# Patient Record
Sex: FEMALE | Race: WHITE | Employment: OTHER | URBAN - METROPOLITAN AREA
[De-identification: names, ages, dates, MRNs, and addresses within clinical notes are randomized per-mention and may not be internally consistent; named-entity substitution may affect disease eponyms.]

---

## 2017-10-05 ENCOUNTER — APPOINTMENT (OUTPATIENT)
Dept: CT IMAGING | Facility: CLINIC | Age: 82
End: 2017-10-05
Attending: EMERGENCY MEDICINE
Payer: MEDICARE

## 2017-10-05 ENCOUNTER — HOSPITAL ENCOUNTER (EMERGENCY)
Facility: CLINIC | Age: 82
Discharge: HOME | End: 2017-10-05
Attending: EMERGENCY MEDICINE | Admitting: EMERGENCY MEDICINE
Payer: MEDICARE

## 2017-10-05 VITALS
DIASTOLIC BLOOD PRESSURE: 79 MMHG | BODY MASS INDEX: 26.58 KG/M2 | TEMPERATURE: 98.3 F | SYSTOLIC BLOOD PRESSURE: 138 MMHG | HEIGHT: 63 IN | HEART RATE: 81 BPM | WEIGHT: 150 LBS | OXYGEN SATURATION: 95 % | RESPIRATION RATE: 16 BRPM

## 2017-10-05 DIAGNOSIS — R11.2 NAUSEA AND VOMITING, INTRACTABILITY OF VOMITING NOT SPECIFIED, UNSPECIFIED VOMITING TYPE: ICD-10-CM

## 2017-10-05 LAB
ALBUMIN SERPL-MCNC: 4.1 G/DL (ref 3.4–5)
ALBUMIN UR-MCNC: ABNORMAL MG/DL
ALP SERPL-CCNC: 100 U/L (ref 40–150)
ALT SERPL W P-5'-P-CCNC: 45 U/L (ref 0–50)
AMORPH CRY #/AREA URNS HPF: ABNORMAL /HPF
ANION GAP SERPL CALCULATED.3IONS-SCNC: 13 MMOL/L (ref 3–14)
APPEARANCE UR: CLEAR
AST SERPL W P-5'-P-CCNC: 20 U/L (ref 0–45)
BASOPHILS # BLD AUTO: 0 10E9/L (ref 0–0.2)
BASOPHILS NFR BLD AUTO: 0.1 %
BILIRUB SERPL-MCNC: 1 MG/DL (ref 0.2–1.3)
BILIRUB UR QL STRIP: NEGATIVE
BUN SERPL-MCNC: 10 MG/DL (ref 7–30)
CALCIUM SERPL-MCNC: 10.2 MG/DL (ref 8.5–10.1)
CHLORIDE SERPL-SCNC: 100 MMOL/L (ref 94–109)
CO2 SERPL-SCNC: 23 MMOL/L (ref 20–32)
COLOR UR AUTO: YELLOW
CREAT SERPL-MCNC: 0.73 MG/DL (ref 0.52–1.04)
DIFFERENTIAL METHOD BLD: ABNORMAL
EOSINOPHIL # BLD AUTO: 0 10E9/L (ref 0–0.7)
EOSINOPHIL NFR BLD AUTO: 0 %
ERYTHROCYTE [DISTWIDTH] IN BLOOD BY AUTOMATED COUNT: 14 % (ref 10–15)
GFR SERPL CREATININE-BSD FRML MDRD: 76 ML/MIN/1.7M2
GLUCOSE SERPL-MCNC: 147 MG/DL (ref 70–99)
GLUCOSE UR STRIP-MCNC: NEGATIVE MG/DL
HCT VFR BLD AUTO: 43.5 % (ref 35–47)
HGB BLD-MCNC: 15.3 G/DL (ref 11.7–15.7)
HGB UR QL STRIP: NEGATIVE
IMM GRANULOCYTES # BLD: 0 10E9/L (ref 0–0.4)
IMM GRANULOCYTES NFR BLD: 0.4 %
KETONES UR STRIP-MCNC: 40 MG/DL
LEUKOCYTE ESTERASE UR QL STRIP: ABNORMAL
LIPASE SERPL-CCNC: 94 U/L (ref 73–393)
LYMPHOCYTES # BLD AUTO: 0.9 10E9/L (ref 0.8–5.3)
LYMPHOCYTES NFR BLD AUTO: 13.7 %
MCH RBC QN AUTO: 29.3 PG (ref 26.5–33)
MCHC RBC AUTO-ENTMCNC: 35.2 G/DL (ref 31.5–36.5)
MCV RBC AUTO: 83 FL (ref 78–100)
MONOCYTES # BLD AUTO: 0.3 10E9/L (ref 0–1.3)
MONOCYTES NFR BLD AUTO: 3.9 %
NEUTROPHILS # BLD AUTO: 5.6 10E9/L (ref 1.6–8.3)
NEUTROPHILS NFR BLD AUTO: 81.9 %
NITRATE UR QL: NEGATIVE
NON-SQ EPI CELLS #/AREA URNS LPF: ABNORMAL /LPF
NRBC # BLD AUTO: 0 10*3/UL
NRBC BLD AUTO-RTO: 0 /100
PH UR STRIP: 7 PH (ref 5–7)
PLATELET # BLD AUTO: 286 10E9/L (ref 150–450)
POTASSIUM SERPL-SCNC: 3.8 MMOL/L (ref 3.4–5.3)
PROT SERPL-MCNC: 8.4 G/DL (ref 6.8–8.8)
RBC # BLD AUTO: 5.23 10E12/L (ref 3.8–5.2)
RBC #/AREA URNS AUTO: ABNORMAL /HPF
SODIUM SERPL-SCNC: 136 MMOL/L (ref 133–144)
SOURCE: ABNORMAL
SP GR UR STRIP: 1.01 (ref 1–1.03)
UROBILINOGEN UR STRIP-ACNC: 0.2 EU/DL (ref 0.2–1)
WBC # BLD AUTO: 6.8 10E9/L (ref 4–11)
WBC #/AREA URNS AUTO: ABNORMAL /HPF
YEAST #/AREA URNS HPF: ABNORMAL /HPF

## 2017-10-05 PROCEDURE — 81001 URINALYSIS AUTO W/SCOPE: CPT | Performed by: EMERGENCY MEDICINE

## 2017-10-05 PROCEDURE — 80053 COMPREHEN METABOLIC PANEL: CPT | Performed by: EMERGENCY MEDICINE

## 2017-10-05 PROCEDURE — 96361 HYDRATE IV INFUSION ADD-ON: CPT

## 2017-10-05 PROCEDURE — 83690 ASSAY OF LIPASE: CPT | Performed by: EMERGENCY MEDICINE

## 2017-10-05 PROCEDURE — 25000125 ZZHC RX 250: Performed by: EMERGENCY MEDICINE

## 2017-10-05 PROCEDURE — 85025 COMPLETE CBC W/AUTO DIFF WBC: CPT | Performed by: EMERGENCY MEDICINE

## 2017-10-05 PROCEDURE — 96360 HYDRATION IV INFUSION INIT: CPT | Mod: 59

## 2017-10-05 PROCEDURE — 74177 CT ABD & PELVIS W/CONTRAST: CPT

## 2017-10-05 PROCEDURE — 99285 EMERGENCY DEPT VISIT HI MDM: CPT | Mod: 25

## 2017-10-05 PROCEDURE — 25000128 H RX IP 250 OP 636: Performed by: EMERGENCY MEDICINE

## 2017-10-05 RX ORDER — ONDANSETRON 2 MG/ML
4 INJECTION INTRAMUSCULAR; INTRAVENOUS ONCE
Status: DISCONTINUED | OUTPATIENT
Start: 2017-10-05 | End: 2017-10-05 | Stop reason: HOSPADM

## 2017-10-05 RX ORDER — ONDANSETRON 4 MG/1
4 TABLET, ORALLY DISINTEGRATING ORAL EVERY 8 HOURS PRN
Qty: 10 TABLET | Refills: 0 | Status: SHIPPED | OUTPATIENT
Start: 2017-10-05 | End: 2017-10-08

## 2017-10-05 RX ORDER — IOPAMIDOL 755 MG/ML
75 INJECTION, SOLUTION INTRAVASCULAR ONCE
Status: COMPLETED | OUTPATIENT
Start: 2017-10-05 | End: 2017-10-05

## 2017-10-05 RX ADMIN — SODIUM CHLORIDE 63 ML: 9 INJECTION, SOLUTION INTRAVENOUS at 18:53

## 2017-10-05 RX ADMIN — SODIUM CHLORIDE 1000 ML: 9 INJECTION, SOLUTION INTRAVENOUS at 14:45

## 2017-10-05 RX ADMIN — IOPAMIDOL 75 ML: 755 INJECTION, SOLUTION INTRAVENOUS at 18:53

## 2017-10-05 ASSESSMENT — ENCOUNTER SYMPTOMS
NAUSEA: 1
DIARRHEA: 1
FEVER: 0
SHORTNESS OF BREATH: 0
ABDOMINAL PAIN: 0
COUGH: 0
VOMITING: 1

## 2017-10-05 NOTE — ED AVS SNAPSHOT
"  Emergency Department    [address] [facility] [address]    Phone:  [phone]    Fax:  [phone]                                       Salud Arroyo   MRN: [id number]    Department:   Emergency Department   Date of Visit:  10/5/2017           Patient Information     Date Of Birth          2/20/1933        Your diagnoses for this visit were:     Nausea and vomiting, intractability of vomiting not specified, unspecified vomiting type        You were seen by Stan Louie MD and Gildardo Artis MD.      Follow-up Information     Follow up with Hilda Ellis MD In 3 days.    Specialty:  Family Practice    Contact information:    [facility]  [address] [facility] [address]  [phone]          Follow up with  Emergency Department.    Specialty:  EMERGENCY MEDICINE    Why:  As needed, If symptoms worsen    Contact information:    [address] [facility] [address] [phone]        Discharge Instructions          * VOMITING [6yr-Adult]  Vomiting is a common symptom that may be due to different causes. These include gastroenteritis (\"stomach-flu\"), food poisoning and gastritis. There are other more serious causes of vomiting which may be hard to diagnose early in the illness. Therefore, it is important to watch for the warning signs listed below.  The main danger from repeated vomiting is \"dehydration\". This is due to excess loss of water and minerals from the body. When this occurs, body fluids must be replaced.`  HOME CARE:    If symptoms are severe, rest at home for the next 24 hours.    You may use acetaminophen (Tylenol) 650-1000 mg every 6 hours to control fever, unless another medicine was prescribed. [ NOTE : If you have chronic liver disease, talk with your doctor before using acetaminophen.] (Aspirin should never be used in anyone under 18 years of age who is ill with a fever. It may cause severe liver damage.)    Avoid " tobacco and alcohol use, which may worsen your symptoms.    If medicines for vomiting were prescribed, take as directed.  DURING THE FIRST 12-24 HOURS follow the diet below. Try to take frequent small sips even if you vomit occasionally:    FRUIT JUICES: Apple, grape juice, clear fruit drinks, electrolyte replacement and sports drinks.    BEVERAGES: Sport drinks such as Gatorade, soft drinks without caffeine; mineral water (plain or flavored), decaffeinated tea and coffee.    SOUPS: Clear broth, consommé and bouillon    DESSERTS: Plain gelatin (Jell-O), popsicles and fruit juice bars.  DURING THE NEXT 24 HOURS you may add the following to the above:    Hot cereal, plain toast, bread, rolls, crackers    Plain noodles, rice, mashed potatoes, chicken noodle or rice soup    Unsweetened canned fruit (avoid pineapple), bananas    Avoid dairy products    Limit caffeine and chocolate. No spices or seasonings except salt.  DURING THE NEXT 24 HOURS  Slowly go back to a normal diet, as you feel better and your symptoms lessen.  FOLLOW UP with your doctor as advised if you are not improving over the next 2-3 days.  GET PROMPT MEDICAL ATTENTION if any of the following occur:    Constant abdominal pain that stays in the same spot or gets worse    Continued vomiting (unable to keep liquids down) for 24 hours    Frequent diarrhea (more than 5 times a day); blood (red or black color) in diarrhea    No urine output for 12 hours or extreme thirst    Weakness, dizziness or fainting    Unusually drowsy or confused    Fever over 101.0  F (38.3  C) for more than 3 days    Yellow color of the eyes or skin    9965-7259 [facility], [address] [place] [address]. All rights reserved. This information is not intended as a substitute for professional medical care. Always follow your healthcare professional's instructions.      24 Hour Appointment Hotline       To make an appointment at any [facility], call [phone]  ([phone]). If you don't have a family doctor or clinic, we will help you find one. [facility] are conveniently located to serve the needs of you and your family.             Review of your medicines      START taking        Dose / Directions Last dose taken    ondansetron 4 MG ODT tab   Commonly known as:  ZOFRAN ODT   Dose:  4 mg   Quantity:  10 tablet        Take 1 tablet (4 mg) by mouth every 8 hours as needed   Refills:  0                Prescriptions were sent or printed at these locations (1 Prescription)                   Other Prescriptions                Printed at Department/Unit printer (1 of 1)         ondansetron (ZOFRAN ODT) 4 MG ODT tab                Procedures and tests performed during your visit     *UA reflex to Microscopic    CBC with platelets differential    CT Abdomen Pelvis w Contrast    Comprehensive metabolic panel    Lipase    Peripheral IV catheter    Urine Microscopic      Orders Needing Specimen Collection     None      Pending Results     Date and Time Order Name Status Description    10/5/2017 1628 CT Abdomen Pelvis w Contrast Preliminary             Pending Culture Results     No orders found from 10/3/2017 to 10/6/2017.            Pending Results Instructions     If you had any lab results that were not finalized at the time of your Discharge, you can call the ED Lab Result RN at [phone]. You will be contacted by this team for any positive Lab results or changes in treatment. The nurses are available 7 days a week from 10A to 6:30P.  You can leave a message 24 hours per day and they will return your call.        Test Results From Your Hospital Stay        10/5/2017  3:03 PM      Component Results     Component Value Ref Range & Units Status    WBC 6.8 4.0 - 11.0 10e9/L Final    RBC Count 5.23 (H) 3.8 - 5.2 10e12/L Final    Hemoglobin 15.3 11.7 - 15.7 g/dL Final    Hematocrit 43.5 35.0 - 47.0 % Final    MCV 83 78 - 100 fl Final    MCH 29.3 26.5 - 33.0 pg Final     MCHC 35.2 31.5 - 36.5 g/dL Final    RDW 14.0 10.0 - 15.0 % Final    Platelet Count 286 150 - 450 10e9/L Final    Diff Method Automated Method  Final    % Neutrophils 81.9 % Final    % Lymphocytes 13.7 % Final    % Monocytes 3.9 % Final    % Eosinophils 0.0 % Final    % Basophils 0.1 % Final    % Immature Granulocytes 0.4 % Final    Nucleated RBCs 0 0 /100 Final    Absolute Neutrophil 5.6 1.6 - 8.3 10e9/L Final    Absolute Lymphocytes 0.9 0.8 - 5.3 10e9/L Final    Absolute Monocytes 0.3 0.0 - 1.3 10e9/L Final    Absolute Eosinophils 0.0 0.0 - 0.7 10e9/L Final    Absolute Basophils 0.0 0.0 - 0.2 10e9/L Final    Abs Immature Granulocytes 0.0 0 - 0.4 10e9/L Final    Absolute Nucleated RBC 0.0  Final         10/5/2017  3:22 PM      Component Results     Component Value Ref Range & Units Status    Sodium 136 133 - 144 mmol/L Final    Potassium 3.8 3.4 - 5.3 mmol/L Final    Chloride 100 94 - 109 mmol/L Final    Carbon Dioxide 23 20 - 32 mmol/L Final    Anion Gap 13 3 - 14 mmol/L Final    Glucose 147 (H) 70 - 99 mg/dL Final    Urea Nitrogen 10 7 - 30 mg/dL Final    Creatinine 0.73 0.52 - 1.04 mg/dL Final    GFR Estimate 76 >60 mL/min/1.7m2 Final    Non  GFR Calc    GFR Estimate If Black >90 >60 mL/min/1.7m2 Final    African American GFR Calc    Calcium 10.2 (H) 8.5 - 10.1 mg/dL Final    Bilirubin Total 1.0 0.2 - 1.3 mg/dL Final    Albumin 4.1 3.4 - 5.0 g/dL Final    Protein Total 8.4 6.8 - 8.8 g/dL Final    Alkaline Phosphatase 100 40 - 150 U/L Final    ALT 45 0 - 50 U/L Final    AST 20 0 - 45 U/L Final         10/5/2017  3:22 PM      Component Results     Component Value Ref Range & Units Status    Lipase 94 73 - 393 U/L Final         10/5/2017  7:25 PM      Narrative     CT ABDOMEN PELVIS WITH CONTRAST 10/5/2017 7:17 PM    HISTORY: Vomiting.    TECHNIQUE: Helical axial scans from dome of liver through pubic  symphysis with 75 mL [id number] IV contrast. Radiation dose for this  scan was reduced using  automated exposure control, adjustment of the  mA and/or kV according to patient size, or iterative reconstruction  technique.    COMPARISON: None.    FINDINGS: The liver, spleen, pancreas and bilateral adrenal glands are  normal. There are a few small renal cysts, largest in the left lower  pole measuring 2 cm. The kidneys are otherwise unremarkable. The bowel  and mesentery in the upper abdomen appear normal.    Scans through the pelvis show no acute abnormality. There is a  retrocecal appendix with no evidence for appendicitis. No free fluid.  No uterus is seen consistent with marked uterine atrophy or prior  hysterectomy.        Impression     IMPRESSION:  1. No acute appearing abnormality in the abdomen or pelvis.  2. Small renal cysts.         10/5/2017  5:46 PM      Component Results     Component Value Ref Range & Units Status    Color Urine Yellow  Final    Appearance Urine Clear  Final    Glucose Urine Negative NEG^Negative mg/dL Final    Bilirubin Urine Negative NEG^Negative Final    Ketones Urine 40 (A) NEG^Negative mg/dL Final    Specific Gravity Urine 1.015 1.003 - 1.035 Final    Blood Urine Negative NEG^Negative Final    pH Urine 7.0 5.0 - 7.0 pH Final    Protein Albumin Urine Trace (A) NEG^Negative mg/dL Final    Urobilinogen Urine 0.2 0.2 - 1.0 EU/dL Final    Nitrite Urine Negative NEG^Negative Final    Leukocyte Esterase Urine Small (A) NEG^Negative Final    Source Midstream Urine  Final         10/5/2017  5:46 PM      Component Results     Component Value Ref Range & Units Status    WBC Urine 2-5 (A) OTO2^O - 2 /HPF Final    RBC Urine O - 2 OTO2^O - 2 /HPF Final    Squamous Epithelial /LPF Urine Many (A) FEW^Few /LPF Final    Yeast Urine Moderate (A) NEG^Negative /HPF Final    Amorphous Crystals Few (A) NEG^Negative /HPF Final                Clinical Quality Measure: Blood Pressure Screening     Your blood pressure was checked while you were in the emergency department today. The last reading we  "obtained was  BP: 141/75 . Please read the guidelines below about what these numbers mean and what you should do about them.  If your systolic blood pressure (the top number) is less than 120 and your diastolic blood pressure (the bottom number) is less than 80, then your blood pressure is normal. There is nothing more that you need to do about it.  If your systolic blood pressure (the top number) is 120-139 or your diastolic blood pressure (the bottom number) is 80-89, your blood pressure may be higher than it should be. You should have your blood pressure rechecked within a year by a primary care provider.  If your systolic blood pressure (the top number) is 140 or greater or your diastolic blood pressure (the bottom number) is 90 or greater, you may have high blood pressure. High blood pressure is treatable, but if left untreated over time it can put you at risk for heart attack, stroke, or kidney failure. You should have your blood pressure rechecked by a primary care provider within the next 4 weeks.  If your provider in the emergency department today gave you specific instructions to follow-up with your doctor or provider even sooner than that, you should follow that instruction and not wait for up to 4 weeks for your follow-up visit.        Thank you for choosing [facility]       Thank you for choosing [facility] for your care. Our goal is always to provide you with excellent care. Hearing back from our patients is one way we can continue to improve our services. Please take a few minutes to complete the written survey that you may receive in the mail after you visit with us. Thank you!        appbackrharRoundPegg Information     [facility] lets you send messages to your doctor, view your test results, renew your prescriptions, schedule appointments and more. To sign up, go to [email or web address] . Click on \"Log in\" on the left side of the screen, which will take you to the Welcome page. Then click on \"Sign up Now\" on the " right side of the page.     You will be asked to enter the access code listed below, as well as some personal information. Please follow the directions to create your username and password.     Your access code is: [email or web address]  Expires: 1/3/2018  8:18 PM     Your access code will  in 90 days. If you need help or a new code, please call your [facility] clinic or [phone].        Care EveryWhere ID     This is your Care EveryWhere ID. This could be used by other organizations to access your [facility] medical records  [id number]        Equal Access to Services     [facility]: Hadgilmar Gregory, meghan quinonez, niraj fraser, garland roberson. So [facility] [phone].    ATENCIÓN: Si habla español, tiene a rooney disposición servicios gratuitos de asistencia lingüística. Llame al [phone].    We comply with applicable federal civil rights laws and [place] laws. We do not discriminate on the basis of race, color, national origin, age, disability, sex, sexual orientation, or gender identity.            After Visit Summary       This is your record. Keep this with you and show to your community pharmacist(s) and doctor(s) at your next visit.

## 2017-10-05 NOTE — ED PROVIDER NOTES
History     Chief Complaint:  Nausea & Vomiting     HPI   Salud Arroyo is a 84 year old female with a history of CVA, HTN, hyperlipidemia, IBS, and hypokalemia who presents to the emergency department for evaluation of nausea and vomiting. The patient notes that she began to feel somewhat nauseated yesterday afternoon and subsequently had several episodes of nonbloody vomiting throughout the evening into this morning (last episode being at 1100 this morning). The patient notes that she had an episode of loose nonbloody stool this morning as well. Her continued nausea and vomiting were concerning to her and prompted her ED visit today.She denies any recent measured fevers, cough, shortness of breath, chest pain, or abdominal pain. The patient additionally expressed some concern regarding a rash on her back, though she states that she has not had any discomfort associated with this issue. Of note, the patient has had ongoing generalized fatigue and sleepiness for the past several months and is currently in the midst of a work up for this issue, including scheduled sleep study.     Allergies:  Amoxicillin  Lisinopril  Ranitidine    Medications:    Amlodipine  Losartan  Aspirin  Lexapro  Lipitor  Aricept     Past Medical History:    CVA  Osteoarthritis  HTN  Intestinal disaccharidase deficiencies and disaccharide malabsorption  Hyperlipidemia  Granuloma annulare  Basal cell carcinoma  IBS  Hypokalemia    Past Surgical History:    Hysterectomy  T and A  Breast biopsy  Tubal ligation  Cataract removal  Oophorectomy    Family History:    CAD    Social History:  Presents with her sister.  Negative for tobacco use.  Marital Status:   [5]    Review of Systems   Constitutional: Negative for fever.   Respiratory: Negative for cough and shortness of breath.    Cardiovascular: Negative for chest pain.   Gastrointestinal: Positive for diarrhea, nausea and vomiting. Negative for abdominal pain.   Skin: Positive for  "rash.   All other systems reviewed and are negative.    Physical Exam   First Vitals:  BP: 143/71  Heart Rate: 83  Temp: 98.3  F (36.8  C)  Resp: 16  Height: 160 cm (5' 3\")  Weight: 68 kg (150 lb)  SpO2: 97 %      Physical Exam  General: Appears well-developed and well-nourished.   Head: No signs of trauma.   Mouth/Throat: Oropharynx is clear and moist.   CV: Normal rate and regular rhythm.    Resp: Effort normal and breath sounds normal. No respiratory distress.   GI: Soft. There is no tenderness or guarding.  Normal bowel sounds.  No CVA tenderness.  MSK: Normal range of motion. no edema. No Calf tenderness.  Neuro: The patient is alert and oriented. Speech normal.  GCS 15  Skin: Skin is warm and dry. Minimal faintly erythematous blanching rash to lower back.   Psych: normal mood and affect. behavior is normal.       Emergency Department Course   Imaging:  Radiographic findings were communicated with the patient who voiced understanding of the findings.    CT Abdomen/Pelvis with contrast:   1. No acute appearing abnormality in the abdomen or pelvis.  2. Small renal cysts. As per radiology.    Laboratory:  CBC: WBC: 6.8, HGB: 15.3, PLT: 286  CMP: Glucose 147 (H), Calcium 10.21 (H), o/w WNL (Creatinine: 0.73)    Lipase: 94    UA with micro: ketone 40, trace protein albumin, small Leukocyte Esterase, WBC 2-5 (H), many Squamous epithelial, moderate yeast, few amorphous crystals o/w negative     Interventions:  1445 NS 1L IV    Emergency Department Course:  Nursing notes and vitals reviewed. 1622 I performed an exam of the patient as documented above.     IV inserted. Medicine administered as documented above. Blood drawn. This was sent to the lab for further testing, results above.    The patient was sent for a CT Abdomen Pelvis while in the emergency department, findings above.     The patient provided a urine sample here in the emergency department. This was sent for laboratory testing, findings above.     2123 I " rechecked the patient and discussed the results of her workup thus far. The patient tolerated PO challenge without difficulty here in the emergency department.     Findings and plan explained to the Patient. Patient discharged home with instructions regarding supportive care, medications, and reasons to return. The importance of close follow-up was reviewed. The patient was prescribed Zofran.     I personally reviewed the laboratory results with the Patient and answered all related questions prior to discharge.     Impression & Plan    Medical Decision Making:  Salud Arroyo is a 84 year old female who presents today due to nausea and vomiting.  She states that she began having symptoms yesterday and through the morning today.  She denies having any pain, fevers, diarrhea, or any other complaints.  On my exam, her abdomen was nontender.  She had brought up having a rash on the back, but she has no symptoms associated with this. The rash is fairly unimpressive. Blood work was obtained and was unremarkable, including a normal potassium level.  I did get a CT scan given the patient's age, and this is not show any signs of an acute process.  Patient able tolerate PO in the ER and she had actually had declined the Zofran that was ordered.  Given the overall negative workup, I feel that she is appropriate for discharge home and outpatient management.  She is given a prescription for Zofran and was instructed to return if she was unable to keep anything down, has fevers, worsening abdominal pain, or if she has any other new or concerning symptoms.    Diagnosis:    ICD-10-CM   1. Nausea and vomiting, intractability of vomiting not specified, unspecified vomiting type R11.2     Disposition:  discharged to home    Discharge Medications:   Details   ondansetron (ZOFRAN ODT) 4 MG ODT tab Take 1 tablet (4 mg) by mouth every 8 hours as needed, Disp-10 tablet, R-0, Local Print        I, Jesusita Peng, am serving as a scribe on  10/5/2017 at 4:22 PM to personally document services performed by Gildardo Artis MD based on my observations and the provider's statements to me.     Jesusita Peng  10/5/2017    EMERGENCY DEPARTMENT       Gildardo Artis MD  10/08/17 5095

## 2017-10-05 NOTE — ED AVS SNAPSHOT
Emergency Department    [address] [place] [address]    Phone:  [phone]    Fax:  [phone]                                       Salud Arroyo   MRN: [id number]    Department:   Emergency Department   Date of Visit:  10/5/2017           After Visit Summary Signature Page     I have received my discharge instructions, and my questions have been answered. I have discussed any challenges I see with this plan with the nurse or doctor.    ..........................................................................................................................................  Patient/Patient Representative Signature      ..........................................................................................................................................  Patient Representative Print Name and Relationship to Patient    ..................................................               ................................................  Date                                            Time    ..........................................................................................................................................  Reviewed by Signature/Title    ...................................................              ..............................................  Date                                                            Time

## 2017-10-06 NOTE — DISCHARGE INSTRUCTIONS
"   * VOMITING [6yr-Adult]  Vomiting is a common symptom that may be due to different causes. These include gastroenteritis (\"stomach-flu\"), food poisoning and gastritis. There are other more serious causes of vomiting which may be hard to diagnose early in the illness. Therefore, it is important to watch for the warning signs listed below.  The main danger from repeated vomiting is \"dehydration\". This is due to excess loss of water and minerals from the body. When this occurs, body fluids must be replaced.`  HOME CARE:    If symptoms are severe, rest at home for the next 24 hours.    You may use acetaminophen (Tylenol) 650-1000 mg every 6 hours to control fever, unless another medicine was prescribed. [ NOTE : If you have chronic liver disease, talk with your doctor before using acetaminophen.] (Aspirin should never be used in anyone under 18 years of age who is ill with a fever. It may cause severe liver damage.)    Avoid tobacco and alcohol use, which may worsen your symptoms.    If medicines for vomiting were prescribed, take as directed.  DURING THE FIRST 12-24 HOURS follow the diet below. Try to take frequent small sips even if you vomit occasionally:    FRUIT JUICES: Apple, grape juice, clear fruit drinks, electrolyte replacement and sports drinks.    BEVERAGES: Sport drinks such as Gatorade, soft drinks without caffeine; mineral water (plain or flavored), decaffeinated tea and coffee.    SOUPS: Clear broth, consommé and bouillon    DESSERTS: Plain gelatin (Jell-O), popsicles and fruit juice bars.  DURING THE NEXT 24 HOURS you may add the following to the above:    Hot cereal, plain toast, bread, rolls, crackers    Plain noodles, rice, mashed potatoes, chicken noodle or rice soup    Unsweetened canned fruit (avoid pineapple), bananas    Avoid dairy products    Limit caffeine and chocolate. No spices or seasonings except salt.  DURING THE NEXT 24 HOURS  Slowly go back to a normal diet, as you feel better and " your symptoms lessen.  FOLLOW UP with your doctor as advised if you are not improving over the next 2-3 days.  GET PROMPT MEDICAL ATTENTION if any of the following occur:    Constant abdominal pain that stays in the same spot or gets worse    Continued vomiting (unable to keep liquids down) for 24 hours    Frequent diarrhea (more than 5 times a day); blood (red or black color) in diarrhea    No urine output for 12 hours or extreme thirst    Weakness, dizziness or fainting    Unusually drowsy or confused    Fever over 101.0  F (38.3  C) for more than 3 days    Yellow color of the eyes or skin    3889-4971 [facility], [address] [place] [address]. All rights reserved. This information is not intended as a substitute for professional medical care. Always follow your healthcare professional's instructions.

## 2017-10-25 ENCOUNTER — ASSISTED LIVING VISIT (OUTPATIENT)
Dept: GERIATRICS | Facility: CLINIC | Age: 82
End: 2017-10-25
Payer: MEDICARE

## 2017-10-25 VITALS
HEART RATE: 73 BPM | DIASTOLIC BLOOD PRESSURE: 70 MMHG | RESPIRATION RATE: 18 BRPM | WEIGHT: 154.8 LBS | TEMPERATURE: 98.4 F | SYSTOLIC BLOOD PRESSURE: 149 MMHG

## 2017-10-25 DIAGNOSIS — R11.2 INTRACTABLE VOMITING WITH NAUSEA, UNSPECIFIED VOMITING TYPE: ICD-10-CM

## 2017-10-25 DIAGNOSIS — R29.6 FALLS FREQUENTLY: ICD-10-CM

## 2017-10-25 DIAGNOSIS — I10 ESSENTIAL HYPERTENSION: ICD-10-CM

## 2017-10-25 DIAGNOSIS — M85.89 OSTEOPENIA OF MULTIPLE SITES: ICD-10-CM

## 2017-10-25 DIAGNOSIS — F43.21 ADJUSTMENT DISORDER WITH DEPRESSED MOOD: ICD-10-CM

## 2017-10-25 DIAGNOSIS — E78.5 HYPERLIPIDEMIA, UNSPECIFIED HYPERLIPIDEMIA TYPE: ICD-10-CM

## 2017-10-25 DIAGNOSIS — K58.0 IRRITABLE BOWEL SYNDROME WITH DIARRHEA: ICD-10-CM

## 2017-10-25 DIAGNOSIS — E73.9 INTESTINAL DISACCHARIDASE DEFICIENCIES AND DISACCHARIDE MALABSORPTION: ICD-10-CM

## 2017-10-25 DIAGNOSIS — M62.81 GENERALIZED MUSCLE WEAKNESS: ICD-10-CM

## 2017-10-25 DIAGNOSIS — Z71.89 ADVANCE CARE PLANNING: ICD-10-CM

## 2017-10-25 DIAGNOSIS — R41.89 COGNITIVE DECLINE: ICD-10-CM

## 2017-10-25 DIAGNOSIS — G47.10 EXCESSIVE SLEEPINESS: ICD-10-CM

## 2017-10-25 DIAGNOSIS — R53.83 FATIGUE, UNSPECIFIED TYPE: ICD-10-CM

## 2017-10-25 DIAGNOSIS — Z86.73 HISTORY OF CVA (CEREBROVASCULAR ACCIDENT): Primary | ICD-10-CM

## 2017-10-25 PROBLEM — Z85.828 HISTORY OF BASAL CELL CARCINOMA: Status: ACTIVE | Noted: 2017-10-25

## 2017-10-25 PROBLEM — L57.0 ACTINIC KERATOSIS: Status: ACTIVE | Noted: 2017-10-25

## 2017-10-25 PROBLEM — L92.0 GRANULOMA ANNULARE: Status: ACTIVE | Noted: 2017-10-25

## 2017-10-25 PROBLEM — M15.9 OSTEOARTHRITIS OF MULTIPLE JOINTS: Status: ACTIVE | Noted: 2017-10-25

## 2017-10-25 PROBLEM — R73.01 IFG (IMPAIRED FASTING GLUCOSE): Status: ACTIVE | Noted: 2017-10-25

## 2017-10-25 PROBLEM — F41.9 ANXIETY: Status: ACTIVE | Noted: 2017-10-25

## 2017-10-25 PROBLEM — R73.9 HYPERGLYCEMIA: Status: ACTIVE | Noted: 2017-10-25

## 2017-10-25 RX ORDER — ATORVASTATIN CALCIUM 40 MG/1
40 TABLET, FILM COATED ORAL AT BEDTIME
COMMUNITY
Start: 2017-09-11 | End: 2018-01-30

## 2017-10-25 RX ORDER — DONEPEZIL HYDROCHLORIDE 10 MG/1
10 TABLET, FILM COATED ORAL AT BEDTIME
COMMUNITY
Start: 2017-09-22 | End: 2017-11-09

## 2017-10-25 RX ORDER — LOPERAMIDE HCL 2 MG
1 CAPSULE ORAL AT BEDTIME
COMMUNITY
End: 2018-02-02

## 2017-10-25 RX ORDER — AMLODIPINE BESYLATE 10 MG/1
TABLET ORAL
COMMUNITY
Start: 2016-12-28 | End: 2018-01-30

## 2017-10-25 RX ORDER — LOSARTAN POTASSIUM 100 MG/1
50 TABLET ORAL DAILY
COMMUNITY
Start: 2017-03-27 | End: 2018-01-30

## 2017-10-25 RX ORDER — ESCITALOPRAM OXALATE 5 MG/1
5 TABLET ORAL AT BEDTIME
COMMUNITY
Start: 2017-09-11 | End: 2018-01-02

## 2017-10-25 NOTE — PROGRESS NOTES
[facility]  PRIMARY CARE PROVIDER AND CLINIC:  JoseArmando ames Daniella [address] / [place] [address]  Chief Complaint   Patient presents with     [facility]       HPI:    Salud Arroyo is a 84 year old  (2/20/1933), with a history of CVA, HTN, hyperlipidemia, IBS, hypokalemia, cognitive decline, depression, generalized fatigue and sleep disturbance who moved to [facility] on 9/27/17 from a senior condo. She has two supportives son Tj and Asher whom are at this visit today and a daughter Lynne who also is involved with ongoing care.  Most recently in the  Emergency Room  [facility] 10/05/2017 for nausea, vomiting and diarrhea. She is new to [facility] for onsite medical services and medication management.. HPI information obtained from: facility chart records, patient report and Care Everywhere Epic chart review.      Current issues are:        History of CVA (cerebrovascular accident)  Cognitive decline  Brain MRI 8/81/17 showed chronic lacunar-type stroke in the anterior thalamus and internal capsule in the right side. Additionally there are chronic small vessel ischemic changes seen bilaterally which are fairly extensive. Noted were also some acute nerve infarcts and volume loss. Sons report significant change in cognition over the past summer. Hx of dehydration and hypokalemia that can exacerbate symptoms. She was hospitalized in Jan 2017 2/2 to above. Cognitive testing 8/2017 24/30 on mini mental. Follows with neurology Dr. Rose from [facility]. He has dx her with Alzheimer-type dementia with significant short-term memory impairment. At today's visit she has a pleasant but flat affect. She jokes at times about being pulled from bed to this meeting and that bed is her best friend. She also endorses being sad about he new self.    Hyperlipidemia, unspecified hyperlipidemia type  Essential hypertension  Family purchased her a BP  cuff. Have some reported readings from 9/2017. Average BP in the afternoon is 148/74 with HR of 79. She denies chest pain or shortness of breath.     Generalized muscle weakness  Falls frequently  Osteopenia of multiple sites  Had a reported fall at [facility] on 9/27/17 and does not appear she sustained injury. Stated that her left leg gave away. Has reported additional times of feeling weak in the legs. Today she is seen ambulating around her apartment w/o any assistive device. One son reported getting a call last week to help her get out of bed. She denies pain in knees and joints today for both upper and lower extremities.    Irritable bowel syndrome with diarrhea  Intestinal disaccharidase deficiencies and disaccharide malabsorption  Intractable vomiting with nausea, unspecified vomiting type  Follows with GI- Dr. Tiffani Chou. Had 25 lbs weight loss over the past 6 months. Recent CBC,TSH CMP, Lyme. B1, B12  grossly unremarkable. Has hx of fecal incontinence but today her son Tj reports that this has improved. She denies abdominal discomfort, pain or bloating. She was on a probiotic but has since discontinued use. She had a hx of belching that is reported being resolved with antibiotic use.     Excessive sleepiness  Adjustment disorder with depressed mood  Fatigue, unspecified type  Reported by sons she spends up to 16 hours daily in bed. She does not deny this. She reports to not sleeping, reading or watching TV but just laying in bed and feeling tired all the time. Hx of socializing, entertaining. Family reports she is apathic, not answering her phone or door. She is open to trying a different antidepressant. Discussion of activating versus neutral or sedating depression medication.     Advance Care Planning  Resident has advance healthcare directive but no POLST on file.    CODE STATUS/ADVANCE DIRECTIVES DISCUSSION:   CPR/Full code   Patient's living condition: lives in an assisted living  facility    ALLERGIES:Amoxicillin; Lisinopril; and Ranitidine  PAST MEDICAL HISTORY:  has a past medical history of Cancer (H); Cataract; Granuloma annulare; HTN (hypertension); Hyperlipidemia; IFG (impaired fasting glucose); Irritable bowel syndrome; Osteopenia; and Uveitis.  PAST SURGICAL HISTORY:  has a past surgical history that includes Hysterectomy; tonsillectomy; adenoidectomy; tubal ligation; nasal/sinus polypectomy; breast biopsy, rt/lt; CATARACT REMOVAL; REMOVAL OF OVARY(S); BLPHPLSTY UP EYELD; W/EXCESS SKIN; and Eye surgery.  FAMILY HISTORY: family history includes Breast Cancer in her sister and another family member; CANCER in her sister; HEART DISEASE in her mother; Macular Degeneration in her maternal aunt.  SOCIAL HISTORY:      Post Discharge Medication Reconciliation Status: patient was not discharged from an inpatient facility.  Current Outpatient Prescriptions   Medication Sig Dispense Refill     amLODIPine (NORVASC) 10 MG tablet TAKE ONE TABLET BY MOUTH AT BEDTIME       aspirin 81 MG EC tablet Take 81 mg by mouth At Bedtime        atorvastatin (LIPITOR) 40 MG tablet Take 40 mg by mouth At Bedtime        donepezil (ARICEPT) 10 MG tablet Take 5 mg by mouth At Bedtime        escitalopram (LEXAPRO) 5 MG tablet Take 5 mg by mouth At Bedtime        loperamide (IMODIUM) 2 MG capsule Take 1 mg by mouth At Bedtime        losartan (COZAAR) 100 MG tablet TAKE ONE TABLET BY MOUTH AT BEDTIME       Multiple Vitamins-Iron (MULTI-VITAMIN  /IRON) TABS Take 1 tablet by mouth At Bedtime          ROS:  4 point ROS including Respiratory, CV, GI and , other than that noted in the HPI,  is negative    Exam:  /70  Pulse 73  Temp 98.4  F (36.9  C)  Resp 18  Wt 154 lb 12.8 oz (70.2 kg)  GENERAL APPEARANCE:  Alert, in no distress, cooperative  ENT:  Mouth and posterior oropharynx normal, moist mucous membranes  EYES:  EOM, conjunctivae, lids, pupils and irises normal  NECK:  No adenopathy,masses or  thyromegaly  RESP:  respiratory effort and palpation of chest normal, lungs clear to auscultation   CV:  Palpation and auscultation of heart done , regular rate and rhythm, no murmur, rub, or gallop, no edema  ABDOMEN:  normal bowel sounds, soft, nontender, no hepatosplenomegaly or other masses  M/S:   Gait and Station at baseline  SKIN:  Inspection of skin and subcutaneous tissue baseline  NEURO:   Cranial nerves 2-12 are normal tested and grossly at patient's baseline  PSYCH:  oriented X 3 but forgetful. Flat affect.    Lab/Diagnostic data:  CBC RESULTS:   Recent Labs   Lab Test  10/05/17   1443   WBC  6.8   RBC  5.23*   HGB  15.3   HCT  43.5   MCV  83   MCH  29.3   MCHC  35.2   RDW  14.0   PLT  286       Last Basic Metabolic Panel:  Recent Labs   Lab Test  10/05/17   1443   NA  136   POTASSIUM  3.8   CHLORIDE  100   PELON  10.2*   CO2  23   BUN  10   CR  0.73   GLC  147*       Liver Function Studies -   Recent Labs   Lab Test  10/05/17   1443   PROTTOTAL  8.4   ALBUMIN  4.1   BILITOTAL  1.0   ALKPHOS  100   AST  20   ALT  45     Lipase (08/16/2017 6:31 PM)  Lipase (08/16/2017 6:31 PM)   Component Value Ref Range   Lipase 28 8 - 78 U/L       TSH with Free T4 (if TSH Abnormal) (08/16/2017 6:31 PM)  TSH with Free T4 (if TSH Abnormal) (08/16/2017 6:31 PM)   Component Value Ref Range   Thyroid Stimulating Hormone 3.59        Lipid Panel and Direct LDL(If Needed) (08/25/2017 11:58 AM)  Lipid Panel and Direct LDL(If Needed) (08/25/2017 11:58 AM)   Component Value Ref Range   Cholesterol 237 (H) 0 - 199 mg/dL   Triglycerides 233 (H) 4 - 149 mg/dL   HDL Cholesterol 40 >39 mg/dL   Cholesterol/HDL Ratio Screen 5.9     LDL Calculated 150 (H) 19 - 130 mg/dL   Length Of Fast z0.0         Lyme Antibody, and Western Blot If Needed) (08/21/2017 9:52 AM)  Lyme Antibody, and Western Blot If Needed) (08/21/2017 9:52 AM)   Component Value Ref Range   Lyme Intepretation Negative Negative   Lyme Units 0.16  Comment:   The magnitude of  the measured result, above the cutoff, is  not indicative of the amount of antibody present.     0.00 - 0.90 IV     Vitamin B-12 (08/21/2017 9:52 AM)  Vitamin B-12 (08/21/2017 9:52 AM)   Component Value Ref Range   Vitamin B12 796 213 - 816 pg/dL       Vitamin B1 (08/25/2017 11:58 AM)  Vitamin B1 (08/25/2017 11:58 AM)   Component Value Ref Range   Vitamin B1 185 (H)  Comment:   INTERPRETIVE INFORMATION: Vitamin B1, Whole Blood  This assay measures the concentration of thiamine  diphosphate (TDP), the primary active form of vitamin B1.  Approximately 90 percent of vitamin B1 present in whole  blood is TDP. Thiamine and thiamine monophosphate, which  comprise the remaining 10 percent, are not measured.  Test developed and characteristics determined by [facility]. See Compliance Statement B: [facility]  Performed by [facility],  [address] [place] [id number] [phone]  [email or web address], Hernando Salas MD - Lab. Director     70 - 180 nmol/L       Hgb A1c (08/25/2017 11:58 AM)  Hgb A1c (08/25/2017 11:58 AM)   Component Value Ref Range   HGB A1C 6.0 (H) 4.0 - 5.6 %     Heavy Metal Screen Blood (08/21/2017 9:52 AM)  Heavy Metal Screen Blood (08/21/2017 9:52 AM)   Component Value Ref Range   Arsenic Blood <10.0  Comment:   INTERPRETIVE INFORMATION: Arsenic, Blood  Potentially toxic ranges for blood arsenic:  Greater than  or equal to 600 ug/L.  Blood arsenic is for the detection of recent exposure only.  Blood arsenic levels in healthy subjects vary considerably  with exposure to arsenic in the diet and the environment.  A 24-hour urine arsenic is useful for the detection of  chronic exposure.  Test developed and characteristics determined by [facility]. See Compliance Statement B: [facility]     0.0 - 13.0 ug/L       Urine Culture (08/21/2017 9:28 AM)  Only the most recent of 2 results within the time period is included.    Urine Culture (08/21/2017 9:28 AM)   Component Value Ref Range    Source Urine     Site       Urine Cuture 10 to 50,000 col/ml Urogenital Vivian      Enteric Stool Pathogens Panel Negative by PCR for Campylobacter group (C. coli, C.  jejuni, and C. umesh), Salmonella species, Shigella species  (including S. dysenteriae, S. boydii, S. sonnei, and S.  flexneri),  Vibrio group (V. cholerae and V.  parahaemolyticus), Yersinia enterocolitica, Shiga Toxin 1  and 2, Norovirus (GI and GII), and Rotavirus (A).     Preliminary Findings  [facility] 9/14/2017    Patient had a min HR of 41 bpm, max HR of 182 bpm, and avg HR of 72 bpm. Predominant underlying rhythm was Sinus Rhythm.         6 Supraventricular Tachycardia runs occurred, the run with the fastest interval lasting 10.5 secs with a max rate of 182 bpm, the longest lasting 18.3 secs with an avg rate of 105 bpm. Some episodes of Supraventricular Tachycardia may be Atrial Tachycardia with variable block.        Second Degree AV Block - Mobitz I (Wenckebach) was present.         Isolated SVEs were rare (<1.0%), SVE Couplets were rare (<1.0%), and SVE Triplets were rare (<1.0%).         Isolated VEs were rare (<1.0%), and no VE Couplets or VE Triplets were present.        ECHOCARDIOGRAM.  Date: 09/01/2017 Start: 02:07 PM [facility]    CONCLUSIONS  Normal left ventricular size and global and regional function.  Left ventricular ejection fraction is visually estimated at 60%.  Mild mitral regurgitation.  No obvious source of embolus identified.    No prior study for comparison.      FINDINGS    MITRAL VALVE  Mild mitral regurgitation.    AORTIC VALVE  The aortic valve is tricuspid.  There is mild aortic sclerosis.    TRICUSPID VALVE  Mild (physiologic) tricuspid regurgitation.  Pulmonary artery systolic pressure estimate is 20mmHg above RAP.  (Normal).    PULMONIC VALVE  The pulmonic valve is not well visualized, but no pulmonic  regurgitation is noted.    LEFT ATRIUM  Left atrial volume index is 29 mL/m^2. (Normal  <34mL/m^2).    LEFT VENTRICLE  Normal left ventricular size and global and regional function.  Left ventricular ejection fraction is visually estimated at 60%.  Normal left ventricular wall thickness.  Indeterminate diastolic function.    RIGHT ATRIUM  Normal right atrium.    RIGHT VENTRICLE  Normal right ventricle size and normal global function.    PERICARDIAL EFFUSION  Epicardial fat is noted.      MISCELLANEOUS  Aortic root dimension within normal limits.  The inferior vena cava is normal suggesting normal RA pressure.     COMPARISON: CT abdomen/pelvis 01/22/2017    FINDINGS:   Clear lung bases. The spleen, adrenals, gallbladder and right kidney are unremarkable. Hepatic steatosis. 2.2 cm left renal cyst, unchanged. Hysterectomy. Mild calcific aortoiliac disease without infrarenal abdominal aortic aneurysm. Normal caliber bowel. Unremarkable appendix. No adenopathy. No free intraperitoneal air. Scattered degenerative change of the spine. No suspicious appearing bony lesions.    IMPRESSION: No CT findings to explain diarrhea or weight loss. Hepatic steatosis.    MR Brain W/WO IV Cont (08/31/2017 1:03 PM)    MR Brain W/WO IV Cont (08/31/2017 1:03 PM)   Impressions   IMPRESSION:    1. No definite evidence of an acute infarct.    2. Findings most consistent with a chronic lacunar type infarct involving the anterior thalamus and internal capsule on the right.    3. Probable extensive chronic small vessel ischemic disease.    4. Multiple small lacunar type infarcts within the basal ganglia and corona radiata.    5. Volume loss.         ASSESSMENT/PLAN:  (Z86.73) History of CVA (cerebrovascular accident)  (primary encounter diagnosis)  (R41.89) Cognitive decline  Comment: Advancing and Ongoing  Plan:   -Has Neuro psych eval on 1/14/17 with Dr. Christian Del Cid  -Aricept 10 mg po daily-consider GI side effects  -Taper to d/c Escitalopram   -Begin Bupropion 75 mg po daily in the am. Zoloft also activating but has GI side  effects  -Consult to ACP therapy  -Consider Mirtazapine for appetite stimulant- monitor sedation effects if used      (E78.5) Hyperlipidemia, unspecified hyperlipidemia type  (I10) Essential hypertension  Comment: Chronic and Stable  Plan:  -Norvasc 10 mg po daily-dosed at HS PTA  -Losartan 100 mg po daily-given at HS PTA  -ASA 81 mg po daily  -Atorvastatin 40 mg po daily     (M62.81) Generalized muscle weakness  (R29.6) Falls frequently  (M85.89) Osteopenia of multiple sites  Comment: Ongoing  Plan:  -Just completed PT post admission to facility  -Consider additional therapy   -Multiple Vitamin daily    (K58.0) Irritable bowel syndrome with diarrhea  (E73.9) Intestinal disaccharidase deficiencies and disaccharide malabsorption  (R11.2) Intractable vomiting with nausea, unspecified vomiting type  Comment: Chronic- controlled currently  Plan:   -Imodium 1 mg po daily at HS  -Monitor weights and VS  -Won't take Ensure but would consider magic cups  -Consider nutrition consult    (G47.10) Excessive sleepiness  (F43.21) Adjustment disorder with depressed mood  (R53.83) Fatigue, unspecified type  Comment: Ongoing and continued issue  Plan:   --Taper to d/c Escitalopram   -Begin Bupropion 75 mg po daily in the am. Zoloft also activating but has GI side effects  -Consult to ACP therapy  -Consider Mirtazapine for appetite stimulant- monitor sedation effects if used    (Z71.89) Advance Care Planning  Comment: Ongoing  Plan:  -Resident has Health Care Directive   -Discussion of POLST document and completed      Total time with a new patient visit in the assisted livin minutes including discussions about the POC and care coordination with the patient and family, Bry. Greater than 50% of total time spent with counseling and coordinating care due to many chonic conditions    Electronically signed by:  ZAYNAB Hale CNP

## 2017-10-26 PROBLEM — G47.10 EXCESSIVE SLEEPINESS: Status: ACTIVE | Noted: 2017-10-26

## 2017-10-26 PROBLEM — E87.6 HYPOKALEMIA: Status: ACTIVE | Noted: 2017-10-26

## 2017-11-02 ENCOUNTER — NURSING HOME VISIT (OUTPATIENT)
Dept: GERIATRICS | Facility: CLINIC | Age: 82
End: 2017-11-02
Payer: MEDICARE

## 2017-11-02 VITALS
BODY MASS INDEX: 26.08 KG/M2 | RESPIRATION RATE: 16 BRPM | HEART RATE: 57 BPM | SYSTOLIC BLOOD PRESSURE: 118 MMHG | WEIGHT: 147.2 LBS | OXYGEN SATURATION: 97 % | DIASTOLIC BLOOD PRESSURE: 63 MMHG | TEMPERATURE: 97.7 F

## 2017-11-02 DIAGNOSIS — F43.21 ADJUSTMENT DISORDER WITH DEPRESSED MOOD: ICD-10-CM

## 2017-11-02 DIAGNOSIS — R29.6 FALLS FREQUENTLY: ICD-10-CM

## 2017-11-02 DIAGNOSIS — M62.81 GENERALIZED MUSCLE WEAKNESS: Primary | ICD-10-CM

## 2017-11-02 DIAGNOSIS — G47.10 HYPERSOMNIA: ICD-10-CM

## 2017-11-02 DIAGNOSIS — G47.33 OSA (OBSTRUCTIVE SLEEP APNEA): ICD-10-CM

## 2017-11-02 DIAGNOSIS — I10 ESSENTIAL HYPERTENSION: ICD-10-CM

## 2017-11-02 DIAGNOSIS — K58.0 IRRITABLE BOWEL SYNDROME WITH DIARRHEA: ICD-10-CM

## 2017-11-02 DIAGNOSIS — R41.89 COGNITIVE DECLINE: ICD-10-CM

## 2017-11-02 DIAGNOSIS — Z86.73 HISTORY OF CVA (CEREBROVASCULAR ACCIDENT): ICD-10-CM

## 2017-11-02 PROCEDURE — 99310 SBSQ NF CARE HIGH MDM 45: CPT | Performed by: NURSE PRACTITIONER

## 2017-11-02 NOTE — PROGRESS NOTES
[facility]  PRIMARY CARE PROVIDER AND CLINIC:  Armando Torres [address] / [place] [address]  Chief Complaint   Patient presents with     Hospital F/U       HPI:    Salud Arroyo is a 84 year old  (2/20/1933),admitted to the [facility] TCU from [facility].  Hospital stay 10/30/2017 through 11/02/2017.  Admitted to this facility for  rehab, medical management and nursing care.  HPI information obtained from: facility chart records, facility staff, patient report and [facility] chart review.  Current issues are:    Patient transferred to TCU from obs unit at [facility]. She originally went in for a sleep study but required a few days of monitoring due to AMS, dizziness, and unwitnessed fall on 10/30 in sleep center.   Generalized muscle weakness  Falls frequently  Patient fell in obs unit. She did have brain MRI after fall, which was essentially unchanged from August 2017.   She did work with therapy while in obs unit. She can walk up to 125'. Today she is up with therapy  walking about her room.     There is a note from her PCP at [facility]: CT in 2016 showed that she had dilated ventricles, cerebral atrophy and there was some question of normal pressure hydrocephalus.    History of CVA (cerebrovascular accident)  Cognitive decline    10/31/17 MRI of brain shows unchanged extensive chronic small vessel ischemic disease. Lacunar infarcts in right basal ganglia and thalamus and corona radiata. Probable unchanged tiny chronic left cerebellar infarct.   CPT by OCCUPATIONAL THERAPY in obs unit 4.3/5.6    Head CT 8/21/17   IMPRESSION:  1. Old appearing lacunar infarct in the right basal ganglia appears to be new since comparison.  2. The mild ventriculomegaly relative to the prominence of the sulci is similar. Differential considerations would include disproportionate central volume loss, although normal pressure hydrocephalus would be difficult to fully exclude.  2.  Moderate to marked patchy hypoattenuation in the periventricular white matter is nonspecific but in this age group most likely represents small vessel ischemic change.  She was evaluated by neurology at [facility] on 8/25/17: h/o cognitive decline since fall 2016. Decline has been gradual but as of Fall 2017 now needs help with ADLs. MMSE 26/30 with short term memory deficit. IMP: suspect Alz type dementia. Ischemic stroke 11/2016. Small vessel ischemic change of brain. She did have a ziopatch that showed 3 triggered events with sinus rhythm 6 supraventricular tachycardia runs.   She was started on aricept on 8/25/17 and dose increased on 9/21/17.    She did have neuropsychiatric testing scheduled. Unclear if this has occurred.     Adjustment disorder with depressed mood  She is reported to sleep up to 16h per day. She prefers to stay in bed all day and sleep. Notes indicate she is not socializing as she used to.   She is on lexapro, unclear when this was started.     hypersomonia- she prefers to stay in bed all day. She will sleep up to 16h per day  HÉCTOR (obstructive sleep apnea)  She was referred to sleep lab by pulmonologist. She was diagnosed with HÉCTOR on 10/30. It was explained to family that it takes two weeks for testing results to be evaluated. Will need follow up appointment.     Irritable bowel syndrome with diarrhea  She did have a GI consult in 8/16/17. At time of this consult she had  25 lbs weight loss over the past 6 months and was having 4-6 stools per day with fecal incontinence. She was prescribed imodium.     Recent CBC,TSH CMP, Lyme. B1, B12  unremarkable. She denies abdominal discomfort, pain or bloating. She was on a probiotic but has since discontinued use. She had a hx of belching that is reported being resolved with antibiotic use.   EGD done 1/23/17: Impression:          - Small hiatus hernia.                       - Normal examined duodenum.                       - Chronic gastritis evolving  to                        atrophic gastritis with no signs of                        past or present ulcer.                       - Healed mild esophagitis                       - Esophageqal dysmotility.                       - Hospital admission probably                        necessitated due to adverse reaction                        to metronidazole and/or                        clarithyomycin. Question if recent                        process is post-infectious                        gastroparesis vs functional dyspepsia  Recommendation:      - Use Prilosec (omeprazole) 20 mg PO                        daily.                       - H. pylori infection not the cause                        of symptoms. If a stronger indication                        to treat develops, would use                        alternative therapy that will need to                        be quadruple drug combo.  Essential hypertension  She continues on losartan and amlodipine.   101/49, 116/58, 101/61    CODE STATUS/ADVANCE DIRECTIVES DISCUSSION:   CPR/Full code   Patient's living condition: lives in an assisted living facility    ALLERGIES:Amoxicillin; Lisinopril; and Ranitidine  PAST MEDICAL HISTORY:  has a past medical history of Cancer (H); Cataract; Granuloma annulare; HTN (hypertension); Hyperlipidemia; IFG (impaired fasting glucose); Irritable bowel syndrome; Osteopenia; and Uveitis.  PAST SURGICAL HISTORY:  has a past surgical history that includes Hysterectomy; tonsillectomy; adenoidectomy; tubal ligation; nasal/sinus polypectomy; breast biopsy, rt/lt; CATARACT REMOVAL; REMOVAL OF OVARY(S); BLPHPLSTY UP EYELD; W/EXCESS SKIN; and Eye surgery.  FAMILY HISTORY: family history includes Breast Cancer in her sister and another family member; CANCER in her sister; HEART DISEASE in her mother; Macular Degeneration in her maternal aunt.  SOCIAL HISTORY:      Post Discharge Medication Reconciliation Status: discharge medications  reconciled, continue medications without change.  Current Outpatient Prescriptions   Medication Sig Dispense Refill     amLODIPine (NORVASC) 10 MG tablet TAKE ONE TABLET BY MOUTH AT BEDTIME       aspirin 81 MG EC tablet Take 81 mg by mouth At Bedtime        atorvastatin (LIPITOR) 40 MG tablet Take 40 mg by mouth At Bedtime        donepezil (ARICEPT) 10 MG tablet Take 5 mg by mouth At Bedtime        escitalopram (LEXAPRO) 5 MG tablet Take 5 mg by mouth At Bedtime        loperamide (IMODIUM) 2 MG capsule Take 1 mg by mouth At Bedtime        losartan (COZAAR) 100 MG tablet TAKE ONE TABLET BY MOUTH AT BEDTIME       Multiple Vitamins-Iron (MULTI-VITAMIN  /IRON) TABS Take 1 tablet by mouth At Bedtime          ROS:  4 point ROS including Respiratory, CV, GI and , other than that noted in the HPI,  is negative    Exam:  /63  Pulse 57  Temp 97.7  F (36.5  C)  Resp 16  Wt 147 lb 3.2 oz (66.8 kg)  SpO2 97%  BMI 26.08 kg/m2  GENERAL APPEARANCE:  Alert, in no distress  ENT:  Mouth and posterior oropharynx normal, moist mucous membranes, hearing acuity adeaq   EYES:  EOM, conjunctivae, lids, pupils and irises normal  NECK:  No adenopathy,masses or thyromegaly  RESP:  respiratory effort and palpation of chest normal, no respiratory distress, Lung sounds clear  CV:  Palpation and auscultation of heart done , rate and rhythm reg, no murmur, no rub or gallop, Edema none  ABDOMEN:  normal bowel sounds, soft, nontender, no hepatosplenomegaly or other masses  M/S:   Gait and station- steady, Digits and nails normal   SKIN:  Inspection/Palpation of skin and subcutaneous tissue no rash  NEURO: 2-12 in normal limits and at patient's baseline  PSYCH:  insight and judgement, memory impaired , affect and mood flat      Lab/Diagnostic data:   [facility] labs:  CBC w/Diff (10/30/2017 6:54 PM)  CBC w/Diff (10/30/2017 6:54 PM)   Component Value Ref Range   White Blood Cell Count 6.7 3.8 - 11.0 k/cmm   Red Blood Cell Count 4.83 3.70 -  5.20 m/cmm   Hemoglobin 14.1 11.8 - 15.5 g/dL   Hematocrit 41.3 35.0 - 46.0 %   Mean Corpuscular Volume 85.5 80.0 - 100.0 fL   RDW 13.2 11.0 - 15.0 %   Platelet Count 259 140 - 450 k/cmm   Basic Metabolic Panel (10/30/2017 6:54 PM)  Basic Metabolic Panel (10/30/2017 6:54 PM)   Component Value Ref Range   Creatinine Serum 0.79 0.55 - 1.02 mg/dL   Lab Glucose 106 (H)  Comment:   The stated glucose range is for the fasting state.  Non-fasting glucose range is  mg/dL     70 - 100 mg/dL   CO2 25 22 - 31 mmol/L   Chloride 104 98 - 109 mmol/L   Potassium 3.9 3.5 - 5.2 mmol/L   Sodium 140 136 - 145 mmol/L   Blood Urea Nitrogen 11 9 - 26 mg/dL   Calcium 10.0 8.4 - 10.2 mg/dL   Est GFR African Am >60 >60 mL/min/1.73m2   Est GFR Non-Afr Am >60  Comment:   Normal>60, moderate decrease 30 - 59, severe decrease 15 - 29,  renal failure <15 mL/min/1.73 m2  NOTE:  Choose the eGFR result above appropriate for the race of the patient.     >60 mL/min/1.73m2       CBC RESULTS:   Recent Labs   Lab Test  10/05/17   1443   WBC  6.8   RBC  5.23*   HGB  15.3   HCT  43.5   MCV  83   MCH  29.3   MCHC  35.2   RDW  14.0   PLT  286       Last Basic Metabolic Panel:  Recent Labs   Lab Test  10/05/17   1443   NA  136   POTASSIUM  3.8   CHLORIDE  100   PELON  10.2*   CO2  23   BUN  10   CR  0.73   GLC  147*       Liver Function Studies -   Recent Labs   Lab Test  10/05/17   1443   PROTTOTAL  8.4   ALBUMIN  4.1   BILITOTAL  1.0   ALKPHOS  100   AST  20   ALT  45       No results found for: TSH]    No results found for: A1C    ASSESSMENT/PLAN:  (M62.81) Generalized muscle weakness  (primary encounter diagnosis)  (R29.6) Falls frequently  Comment: patient transferred to TCU following obs stay at [facility]. She was admitted to obs from sleep study center. She fell in sleep center. This is preceded by a slow decline over past year with wt loss, cognitive decline in interest in socializing, decline in self care. Call placed to neurologist,   Milton, to ask about imaging that suggest NPH.   Plan: physical therapy evaluation.     (Z86.73) History of CVA (cerebrovascular accident)  Comment: multiple Head CTs and brain MRIs suggest small vessel disease and lacunar infarct in right basal ganglia.   She has cognitive impairment but otherwise nonfocal exam  Plan: physical therapy and OCCUPATIONAL THERAPY evaluation    (F43.21) Adjustment disorder with depressed mood  Comment: slow decline over past year with reduced interest in social activities and self care. She sleeps all day if left alone  Plan: ACP to see. Continue lexapro    (G47.33) HÉCTOR (obstructive sleep apnea)  Comment: recent sleep study at [facility].   Plan: follow up with sleep medicine for CPAP  (R41.89) Cognitive decline  (G47.10) Hypersomnia  Comment: unclear cause: MID vs alz ds vs depression or combination of all. Discussed with Dr. Bravo.   Plan: ritalin 2.5mg q day    (K58.0) Irritable bowel syndrome with diarrhea  Comment: has been evaluated by GI and prescribed imodium. Patient also demonstrating less interest in self care.   Plan: OCCUPATIONAL THERAPY     (I10) Essential hypertension  Comment: low BPs  Plan: decrease losartan 50mg q day        Total time spent with patient visit at the skilled nursing facility was 52 minutes including patient visit, review of past records and calls to Neurology and Dr Bravo to discuss plan of care. Greater than 50% of total time spent with counseling and coordinating care due to unclear cause of hypersomnolence, falls and cog decline    Electronically signed by:  ZAYNAB Drake CNP

## 2017-11-03 PROBLEM — G47.33 OSA (OBSTRUCTIVE SLEEP APNEA): Status: ACTIVE | Noted: 2017-11-03

## 2017-11-03 PROBLEM — R41.89 COGNITIVE DECLINE: Status: ACTIVE | Noted: 2017-11-03

## 2017-11-03 PROBLEM — F43.21 ADJUSTMENT DISORDER WITH DEPRESSED MOOD: Status: ACTIVE | Noted: 2017-11-03

## 2017-11-07 ENCOUNTER — ASSISTED LIVING VISIT (OUTPATIENT)
Dept: GERIATRICS | Facility: CLINIC | Age: 82
End: 2017-11-07
Payer: MEDICARE

## 2017-11-07 VITALS
OXYGEN SATURATION: 96 % | DIASTOLIC BLOOD PRESSURE: 60 MMHG | HEART RATE: 59 BPM | WEIGHT: 143.8 LBS | RESPIRATION RATE: 16 BRPM | SYSTOLIC BLOOD PRESSURE: 120 MMHG | BODY MASS INDEX: 25.47 KG/M2 | TEMPERATURE: 97.5 F

## 2017-11-07 DIAGNOSIS — G47.10 HYPERSOMNIA: Primary | ICD-10-CM

## 2017-11-07 DIAGNOSIS — I10 BENIGN ESSENTIAL HYPERTENSION: ICD-10-CM

## 2017-11-07 DIAGNOSIS — F02.80 MIXED ALZHEIMER'S AND VASCULAR DEMENTIA (H): ICD-10-CM

## 2017-11-07 DIAGNOSIS — R10.13 DYSPEPSIA: ICD-10-CM

## 2017-11-07 DIAGNOSIS — F01.50 MIXED ALZHEIMER'S AND VASCULAR DEMENTIA (H): ICD-10-CM

## 2017-11-07 DIAGNOSIS — G47.33 OSA (OBSTRUCTIVE SLEEP APNEA): ICD-10-CM

## 2017-11-07 DIAGNOSIS — Z86.73 HISTORY OF CVA (CEREBROVASCULAR ACCIDENT): ICD-10-CM

## 2017-11-07 DIAGNOSIS — F43.21 ADJUSTMENT DISORDER WITH DEPRESSED MOOD: ICD-10-CM

## 2017-11-07 DIAGNOSIS — G30.9 MIXED ALZHEIMER'S AND VASCULAR DEMENTIA (H): ICD-10-CM

## 2017-11-07 DIAGNOSIS — M62.81 GENERALIZED MUSCLE WEAKNESS: ICD-10-CM

## 2017-11-07 PROCEDURE — 99306 1ST NF CARE HIGH MDM 50: CPT | Performed by: INTERNAL MEDICINE

## 2017-11-07 NOTE — PROGRESS NOTES
Slaud Arroyo is a 84 year old female seen November 7, 2017 at [facility] where she was admitted last week after [facility] observation stay.     Patient was at [facility] for a sleep study, but then did not want to get OOB afterward, so stayed some portion of the next day.   While there she had an unwitnessed fall and was sent to the ER.  Workup and head imaging unremarkable.   History obtained from patient's 2 sons.    She was doing fairly well a year ago, active and social.   She developed some GI symptoms at [facility] last year and was until March before she got better.   Tx'd for H pylori   Then between March and June she seemed more her usual self, going out to eat and socializing with friends.   However at her baseline she would sleep 11-12 hours/night.    Then over the past few months she was sleeping 18-20 hours/day, not wanting to get OOB for any reason.     She was found to have HÉCTOR during her sleep study and started on CPAP; agrees to use that only intermittently.     Also felt to be depressed, makes statements about ready to go.   Psychologist is seeing her here.  Family feels her GI complaints relate to the profound fatigue.   Report she did better when she was on omeprazole last spring.   Is not currently taking that and they ask that it be restarted.      When seen today patient is in bed fully dressed, late morning.   Asks to have the blinds closed, just wants to sleep.   No longer attending to personal grooming.   AL staff reported patient had fecal incontinence while in bed last week, did not want to get OOB to have staff help her clean up   Patient sees several specialists in the [facility] system.  Saw Neurology and dx'd with Alzheimer's dementia.  Started on donepezil; no improvement noted by family and GI symptoms may be worse since then.   In TCU patient has been intermittently refusing her medications.   Started on Ritalin to see if that helps, but no success so far.        Past Medical History:   Diagnosis Date     Cancer (H)      Cataract      Granuloma annulare      HTN (hypertension)      Hyperlipidemia      IFG (impaired fasting glucose)      Irritable bowel syndrome      Osteopenia      Uveitis        Past Surgical History:   Procedure Laterality Date     ADENOIDECTOMY       AS REMOVAL OF OVARY(S)       BLPHPLSTY UP EYELD; W/EXCESS SKIN       BREAST BIOPSY, RT/LT       CATARACT REMOVAL       EYE SURGERY       HYSTERECTOMY       NASAL/SINUS POLYPECTOMY       TONSILLECTOMY       TUBAL LIGATION       Family History   Problem Relation Age of Onset     HEART DISEASE Mother      CANCER Sister      Breast Cancer Sister      Breast Cancer Other      Macular Degeneration Maternal Aunt        SH:   , lives alone at [facility].  Has lived there since June 2017    She has 3 children        Review Of Systems  Skin: negative   Eyes: impaired vision  Ears/Nose/Throat: hearing loss  Respiratory: No shortness of breath, dyspnea on exertion, cough, or hemoptysis  Cardiovascular: negative  Gastrointestinal: poor appetite, nausea, vomiting and diarrhea  Genitourinary: negative  Musculoskeletal: generalized weakness, able to ambulate with FWW, with encouragement.   2 recent unwitnessed falls.     Neurologic: STML, CPT 4.3  Psychiatric: depression  Hematologic/Lymphatic/Immunologic: negative  Endocrine: negative      GENERAL APPEARANCE: alert and no distress  /60  Pulse 59  Temp 97.5  F (36.4  C)  Resp 16  Wt 143 lb 12.8 oz (65.2 kg)  SpO2 96%  BMI 25.47 kg/m2   HEENT: normocephalic, no lesion or abnormalities  NECK: no adenopathy, no asymmetry, masses, or scars and thyroid normal to palpation  RESP: lungs clear to auscultation - no rales, rhonchi or wheezes  CV: regular rate and rhythm, normal S1 S2  ABDOMEN:  soft, nontender, no HSM or masses and bowel sounds normal  MS: extremities normal- no gross deformities noted, no evidence of inflammation in joints, FROM in all  extremities.  SKIN: no suspicious lesions or rashes  NEURO: Normal strength and tone, sensory exam grossly normal, and speech normal  PSYCH: affect flat, disengaged  LYMPHATICS: No cervical,  or supraclavicular nodes    Last Basic Metabolic Panel:  Lab Results   Component Value Date     10/05/2017      Lab Results   Component Value Date    POTASSIUM 3.8 10/05/2017     Lab Results   Component Value Date    CHLORIDE 100 10/05/2017     Lab Results   Component Value Date    PELON 10.2 10/05/2017     Lab Results   Component Value Date    CO2 23 10/05/2017     Lab Results   Component Value Date    BUN 10 10/05/2017     Lab Results   Component Value Date    CR 0.73 10/05/2017     Lab Results   Component Value Date     10/05/2017     Lab Results   Component Value Date    WBC 6.8 10/05/2017     Lab Results   Component Value Date    HGB 15.3 10/05/2017     Lab Results   Component Value Date    MCV 83 10/05/2017     Lab Results   Component Value Date     10/05/2017     10/31/17 MRI of brain shows unchanged extensive chronic small vessel ischemic disease. Lacunar infarcts in right basal ganglia and thalamus and corona radiata. Probable unchanged tiny chronic left cerebellar infarct.       IMP/PLAN:  (G47.10) Hypersomnia  (primary encounter diagnosis)  Comment: reviewed differential with her sons, including HÉCTOR, medications, depression and loss of initiation from dementia; minimal response to Ritalin although sometimes refusing it.     Plan: will endeavor to address all those concerns during TCU, as below.    (M62.81) Generalized muscle weakness  Comment: deconditioning  Plan: PHYSICAL THERAPY / OCCUPATIONAL THERAPY for strengthening, endurance, balance, gait, ADLs.   Discharge goal is return to her AL apartment           (F43.21) Adjustment disorder with depressed mood  Comment: family concerned that she is giving up.     Plan: will look toward changing escitalopram to sertraline and see if that helps.     In  house Psychologist to follow.       (G47.33) HÉCTOR (obstructive sleep apnea)  Comment: on CPAP  Plan: encourage nightly use for as many hours as possible    (Z86.73) History of CVA (cerebrovascular accident)  Comment: by imaging as above.    Plan: ASA, statin, bp meds.        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     Plan: after review with family, will d/c donepezil as ineffective and potentially contributing to GI symptoms.       (R10.13) Dyspepsia  Comment: recurrent GI symptoms, possible IBS.    Plan: restart omeprazole at 10 mg/day       (I10) Essential HTN  Comment: lower bps since TCU admission  Plan: losartan has been decreased, will also decrease amlodipine dose and follow bps.     Sandra Bravo MD

## 2017-11-09 ENCOUNTER — NURSING HOME VISIT (OUTPATIENT)
Dept: GERIATRICS | Facility: CLINIC | Age: 82
End: 2017-11-09
Payer: MEDICARE

## 2017-11-09 VITALS
OXYGEN SATURATION: 96 % | TEMPERATURE: 98 F | RESPIRATION RATE: 17 BRPM | WEIGHT: 143.8 LBS | HEART RATE: 69 BPM | HEIGHT: 63 IN | SYSTOLIC BLOOD PRESSURE: 149 MMHG | DIASTOLIC BLOOD PRESSURE: 78 MMHG | BODY MASS INDEX: 25.48 KG/M2

## 2017-11-09 DIAGNOSIS — F43.21 ADJUSTMENT DISORDER WITH DEPRESSED MOOD: ICD-10-CM

## 2017-11-09 DIAGNOSIS — Z86.73 HISTORY OF CVA (CEREBROVASCULAR ACCIDENT): ICD-10-CM

## 2017-11-09 DIAGNOSIS — R29.6 FALLS FREQUENTLY: ICD-10-CM

## 2017-11-09 DIAGNOSIS — I10 ESSENTIAL HYPERTENSION: ICD-10-CM

## 2017-11-09 DIAGNOSIS — G47.33 OSA (OBSTRUCTIVE SLEEP APNEA): ICD-10-CM

## 2017-11-09 DIAGNOSIS — M62.81 GENERALIZED MUSCLE WEAKNESS: Primary | ICD-10-CM

## 2017-11-09 DIAGNOSIS — K58.0 IRRITABLE BOWEL SYNDROME WITH DIARRHEA: ICD-10-CM

## 2017-11-09 DIAGNOSIS — R41.89 COGNITIVE DECLINE: ICD-10-CM

## 2017-11-09 DIAGNOSIS — G47.10 HYPERSOMNIA: ICD-10-CM

## 2017-11-09 PROCEDURE — 99309 SBSQ NF CARE MODERATE MDM 30: CPT | Performed by: NURSE PRACTITIONER

## 2017-11-09 NOTE — LETTER
11/9/2017        RE: Salud Arroyo  [facility]  [address]  [address]  [place] [address]        [facility]    Chief Complaint   Patient presents with     RECHECK       HPI:    Salud Arroyo is a 84 year old  (2/20/1933), who is being seen today for an episodic care visit at [facility].  HPI information obtained from: facility chart records, facility staff, patient report and [facility] chart review.    Today's concern is:      Patient transferred to TCU from obs unit at [facility]. She originally went in for a sleep study but required a few days of monitoring due to AMS, dizziness, and unwitnessed fall on 10/30 in sleep center.   Generalized muscle weakness  Falls frequently  Patient fell in obs unit. She did have brain MRI after fall, which was essentially unchanged from August 2017.   She did work with therapy while in obs unit. She can walk up to 125'.       History of CVA (cerebrovascular accident)  Cognitive decline    10/31/17 MRI of brain shows unchanged extensive chronic small vessel ischemic disease. Lacunar infarcts in right basal ganglia and thalamus and corona radiata. Probable unchanged tiny chronic left cerebellar infarct.   CPT by OCCUPATIONAL THERAPY in obs unit 4.3/5.6    Head CT 8/21/17   IMPRESSION:  1. Old appearing lacunar infarct in the right basal ganglia appears to be new since comparison.  2. The mild ventriculomegaly relative to the prominence of the sulci is similar. Differential considerations would include disproportionate central volume loss, although normal pressure hydrocephalus would be difficult to fully exclude.  2. Moderate to marked patchy hypoattenuation in the periventricular white matter is nonspecific but in this age group most likely represents small vessel ischemic change.  She was evaluated by neurology at [facility] on 8/25/17: h/o cognitive decline since fall 2016. Decline has been gradual but as of Fall 2017 now  needs help with ADLs. MMSE 26/30 with short term memory deficit. IMP: suspect Alz type dementia. Ischemic stroke 11/2016. Small vessel ischemic change of brain. She did have a ziopatch that showed 3 triggered events with sinus rhythm 6 supraventricular tachycardia runs.   She was started on aricept on 8/25/17 and dose increased on 9/21/17.  We did stop the aricept on 11/7.       Adjustment disorder with depressed mood  She is reported to sleep up to 16h per day. She prefers to stay in bed all day and sleep. Notes indicate she is not socializing as she used to.   She is on lexapro, unclear when this was started.     hypersomonia- she prefers to stay in bed all day. She will sleep up to 16h per day. Ritalin was started on 11/3. Son reports that she was much more alert yesterday. But today she continues to sleep as much as she is allowed.   HÉCTOR (obstructive sleep apnea)  She was referred to sleep lab by pulmonologist. She was diagnosed with HÉCTOR on 10/30. She does have a CPAP now but unclear if she is using it.   Irritable bowel syndrome with diarrhea  She did have a GI consult in 8/16/17. At time of this consult she had  25 lbs weight loss over the past 6 months and was having 4-6 stools per day with fecal incontinence. She was prescribed imodium.     Recent CBC,TSH CMP, Lyme. B1, B12  unremarkable. She denies abdominal discomfort, pain or bloating. She was on a probiotic but has since discontinued use. She had a hx of belching that is reported being resolved with antibiotic use.   EGD done 1/23/17: Impression:          - Small hiatus hernia.                       - Normal examined duodenum.                       - Chronic gastritis evolving to                        atrophic gastritis with no signs of                        past or present ulcer.                       - Healed mild esophagitis                       - Esophageqal dysmotility.                       - Hospital admission probably                         necessitated due to adverse reaction                        to metronidazole and/or                        clarithyomycin. Question if recent                        process is post-infectious                        gastroparesis vs functional dyspepsia  Recommendation:      - Use Prilosec (omeprazole) 20 mg PO                        daily.                       - H. pylori infection not the cause                        of symptoms. If a stronger indication                        to treat develops, would use                        alternative therapy that will need to                        be quadruple drug combo.  Essential hypertension  She continues on losartan and amlodipine.   BP: 149/78, 123/62, 126/71    ALLERGIES: Amoxicillin; Lisinopril; Ranitidine; and Zofran [ondansetron]  Past Medical, Surgical, Family and Social History reviewed and updated in [facility].    Current Outpatient Prescriptions   Medication Sig Dispense Refill     OMEPRAZOLE PO Take 10 mg by mouth every morning       METHYLPHENIDATE HCL PO Take 2.5 mg by mouth daily        amLODIPine (NORVASC) 10 MG tablet TAKE ONE TABLET BY MOUTH AT BEDTIME       aspirin 81 MG EC tablet Take 81 mg by mouth At Bedtime        atorvastatin (LIPITOR) 40 MG tablet Take 40 mg by mouth At Bedtime        escitalopram (LEXAPRO) 5 MG tablet Take 5 mg by mouth At Bedtime        loperamide (IMODIUM) 2 MG capsule Take 1 mg by mouth At Bedtime        losartan (COZAAR) 100 MG tablet Take 50 mg by mouth daily       Multiple Vitamins-Iron (MULTI-VITAMIN  /IRON) TABS Take 1 tablet by mouth At Bedtime        Medications reviewed:  Medications reconciled to facility chart and changes were made to reflect current medications as identified as above med list. Below are the changes that were made:   Medications stopped since last EPIC medication reconciliation:   Medications Discontinued During This Encounter   Medication Reason     donepezil (ARICEPT) 10 MG tablet Medication  "Reconciliation Clean Up       Medications started since last [facility] medication reconciliation:  Orders Placed This Encounter   Medications     OMEPRAZOLE PO     Sig: Take 10 mg by mouth every morning         REVIEW OF SYSTEMS:  4 point ROS including Respiratory, CV, GI and , other than that noted in the HPI,  is negative    Physical Exam:  /78  Pulse 69  Temp 98  F (36.7  C)  Resp 17  Ht 5' 3\" (1.6 m)  Wt 143 lb 12.8 oz (65.2 kg)  SpO2 96%  BMI 25.47 kg/m2  GENERAL APPEARANCE:  Alert, in no distress  ENT:  Mouth and posterior oropharynx normal, moist mucous membranes, hearing acuity adeaq   EYES:  EOM, conjunctivae, lids, pupils and irises normal  NECK:  No adenopathy,masses or thyromegaly  RESP:  respiratory effort and palpation of chest normal, no respiratory distress, Lung sounds clear  CV:  Palpation and auscultation of heart done , rate and rhythm reg, no murmur, no rub or gallop, Edema none  ABDOMEN:  normal bowel sounds, soft, nontender, no hepatosplenomegaly or other masses  M/S:   Gait and station- steady, Digits and nails normal   SKIN:  Inspection/Palpation of skin and subcutaneous tissue no rash  NEURO: 2-12 in normal limits and at patient's baseline  PSYCH:  insight and judgement, memory impaired , affect and mood flat    Recent Labs:    CBC RESULTS:   Recent Labs   Lab Test  10/05/17   1443   WBC  6.8   RBC  5.23*   HGB  15.3   HCT  43.5   MCV  83   MCH  29.3   MCHC  35.2   RDW  14.0   PLT  286       Last Basic Metabolic Panel:  Recent Labs   Lab Test  10/05/17   1443   NA  136   POTASSIUM  3.8   CHLORIDE  100   PELON  10.2*   CO2  23   BUN  10   CR  0.73   GLC  147*       Liver Function Studies -   Recent Labs   Lab Test  10/05/17   1443   PROTTOTAL  8.4   ALBUMIN  4.1   BILITOTAL  1.0   ALKPHOS  100   AST  20   ALT  45       Assessment/Plan:  (M62.81) Generalized muscle weakness  (primary encounter diagnosis)  (R29.6) Falls frequently  Comment: patient transferred to TCU following obs stay " at [facility]. She was admitted to obs from sleep study center. She fell in sleep center. This is preceded by a slow decline over past year with wt loss, cognitive decline in interest in socializing, decline in self care.   Plan: physical therapy and OCCUPATIONAL THERAPY     (Z86.73) History of CVA (cerebrovascular accident)  Comment: multiple Head CTs and brain MRIs suggest small vessel disease and lacunar infarct in right basal ganglia.   She has cognitive impairment but otherwise nonfocal exam  Plan: physical therapy and OCCUPATIONAL THERAPY evaluation    (R41.89) Cognitive decline  (F43.21) Adjustment disorder with depressed mood  (G47.10) Hypersomnia  Comment: unclear cause: MID vs alz ds vs depression or combination of all. She did seem better yesterday but today she continues to sleep all day  Plan: continue ritalin 2.5mg q day  (G47.33) HÉCTOR (obstructive sleep apnea)  Comment: recent sleep study suggest sleep apnea. She now has a CPAP  Plan: continue CPAP    (K58.0) Irritable bowel syndrome with diarrhea  Comment: currently has imodium available.   Plan: monitor    (I10) Essential hypertension  Comment: losartan recently reduced. BPs are better.   Plan: monitor        Electronically signed by  ZAYNAB Drake CNP                      Sincerely,        ZAYNAB Drake CNP

## 2017-11-09 NOTE — PROGRESS NOTES
[facility]    Chief Complaint   Patient presents with     RECHECK       HPI:    Salud Arroyo is a 84 year old  (2/20/1933), who is being seen today for an episodic care visit at [facility].  HPI information obtained from: facility chart records, facility staff, patient report and [facility] chart review.    Today's concern is:      Patient transferred to TCU from obs unit at [facility]. She originally went in for a sleep study but required a few days of monitoring due to AMS, dizziness, and unwitnessed fall on 10/30 in sleep center.   Generalized muscle weakness  Falls frequently  Patient fell in obs unit. She did have brain MRI after fall, which was essentially unchanged from August 2017.   She did work with therapy while in obs unit. She can walk up to 125'.       History of CVA (cerebrovascular accident)  Cognitive decline    10/31/17 MRI of brain shows unchanged extensive chronic small vessel ischemic disease. Lacunar infarcts in right basal ganglia and thalamus and corona radiata. Probable unchanged tiny chronic left cerebellar infarct.   CPT by OCCUPATIONAL THERAPY in obs unit 4.3/5.6    Head CT 8/21/17   IMPRESSION:  1. Old appearing lacunar infarct in the right basal ganglia appears to be new since comparison.  2. The mild ventriculomegaly relative to the prominence of the sulci is similar. Differential considerations would include disproportionate central volume loss, although normal pressure hydrocephalus would be difficult to fully exclude.  2. Moderate to marked patchy hypoattenuation in the periventricular white matter is nonspecific but in this age group most likely represents small vessel ischemic change.  She was evaluated by neurology at [facility] on 8/25/17: h/o cognitive decline since fall 2016. Decline has been gradual but as of Fall 2017 now needs help with ADLs. MMSE 26/30 with short term memory deficit. IMP: suspect Alz type dementia. Ischemic stroke  11/2016. Small vessel ischemic change of brain. She did have a ziopatch that showed 3 triggered events with sinus rhythm 6 supraventricular tachycardia runs.   She was started on aricept on 8/25/17 and dose increased on 9/21/17.  We did stop the aricept on 11/7.       Adjustment disorder with depressed mood  She is reported to sleep up to 16h per day. She prefers to stay in bed all day and sleep. Notes indicate she is not socializing as she used to.   She is on lexapro, unclear when this was started.     hypersomonia- she prefers to stay in bed all day. She will sleep up to 16h per day. Ritalin was started on 11/3. Son reports that she was much more alert yesterday. But today she continues to sleep as much as she is allowed.   HÉCTOR (obstructive sleep apnea)  She was referred to sleep lab by pulmonologist. She was diagnosed with HÉCTOR on 10/30. She does have a CPAP now but unclear if she is using it.   Irritable bowel syndrome with diarrhea  She did have a GI consult in 8/16/17. At time of this consult she had  25 lbs weight loss over the past 6 months and was having 4-6 stools per day with fecal incontinence. She was prescribed imodium.     Recent CBC,TSH CMP, Lyme. B1, B12  unremarkable. She denies abdominal discomfort, pain or bloating. She was on a probiotic but has since discontinued use. She had a hx of belching that is reported being resolved with antibiotic use.   EGD done 1/23/17: Impression:          - Small hiatus hernia.                       - Normal examined duodenum.                       - Chronic gastritis evolving to                        atrophic gastritis with no signs of                        past or present ulcer.                       - Healed mild esophagitis                       - Esophageqal dysmotility.                       - Hospital admission probably                        necessitated due to adverse reaction                        to metronidazole and/or                         clarithyomycin. Question if recent                        process is post-infectious                        gastroparesis vs functional dyspepsia  Recommendation:      - Use Prilosec (omeprazole) 20 mg PO                        daily.                       - H. pylori infection not the cause                        of symptoms. If a stronger indication                        to treat develops, would use                        alternative therapy that will need to                        be quadruple drug combo.  Essential hypertension  She continues on losartan and amlodipine.   BP: 149/78, 123/62, 126/71    ALLERGIES: Amoxicillin; Lisinopril; Ranitidine; and Zofran [ondansetron]  Past Medical, Surgical, Family and Social History reviewed and updated in [facility].    Current Outpatient Prescriptions   Medication Sig Dispense Refill     OMEPRAZOLE PO Take 10 mg by mouth every morning       METHYLPHENIDATE HCL PO Take 2.5 mg by mouth daily        amLODIPine (NORVASC) 10 MG tablet TAKE ONE TABLET BY MOUTH AT BEDTIME       aspirin 81 MG EC tablet Take 81 mg by mouth At Bedtime        atorvastatin (LIPITOR) 40 MG tablet Take 40 mg by mouth At Bedtime        escitalopram (LEXAPRO) 5 MG tablet Take 5 mg by mouth At Bedtime        loperamide (IMODIUM) 2 MG capsule Take 1 mg by mouth At Bedtime        losartan (COZAAR) 100 MG tablet Take 50 mg by mouth daily       Multiple Vitamins-Iron (MULTI-VITAMIN  /IRON) TABS Take 1 tablet by mouth At Bedtime        Medications reviewed:  Medications reconciled to facility chart and changes were made to reflect current medications as identified as above med list. Below are the changes that were made:   Medications stopped since last EPIC medication reconciliation:   Medications Discontinued During This Encounter   Medication Reason     donepezil (ARICEPT) 10 MG tablet Medication Reconciliation Clean Up       Medications started since last [facility] medication reconciliation:  Orders Placed This  "Encounter   Medications     OMEPRAZOLE PO     Sig: Take 10 mg by mouth every morning         REVIEW OF SYSTEMS:  4 point ROS including Respiratory, CV, GI and , other than that noted in the HPI,  is negative    Physical Exam:  /78  Pulse 69  Temp 98  F (36.7  C)  Resp 17  Ht 5' 3\" (1.6 m)  Wt 143 lb 12.8 oz (65.2 kg)  SpO2 96%  BMI 25.47 kg/m2  GENERAL APPEARANCE:  Alert, in no distress  ENT:  Mouth and posterior oropharynx normal, moist mucous membranes, hearing acuity adeaq   EYES:  EOM, conjunctivae, lids, pupils and irises normal  NECK:  No adenopathy,masses or thyromegaly  RESP:  respiratory effort and palpation of chest normal, no respiratory distress, Lung sounds clear  CV:  Palpation and auscultation of heart done , rate and rhythm reg, no murmur, no rub or gallop, Edema none  ABDOMEN:  normal bowel sounds, soft, nontender, no hepatosplenomegaly or other masses  M/S:   Gait and station- steady, Digits and nails normal   SKIN:  Inspection/Palpation of skin and subcutaneous tissue no rash  NEURO: 2-12 in normal limits and at patient's baseline  PSYCH:  insight and judgement, memory impaired , affect and mood flat    Recent Labs:    CBC RESULTS:   Recent Labs   Lab Test  10/05/17   1443   WBC  6.8   RBC  5.23*   HGB  15.3   HCT  43.5   MCV  83   MCH  29.3   MCHC  35.2   RDW  14.0   PLT  286       Last Basic Metabolic Panel:  Recent Labs   Lab Test  10/05/17   1443   NA  136   POTASSIUM  3.8   CHLORIDE  100   PELON  10.2*   CO2  23   BUN  10   CR  0.73   GLC  147*       Liver Function Studies -   Recent Labs   Lab Test  10/05/17   1443   PROTTOTAL  8.4   ALBUMIN  4.1   BILITOTAL  1.0   ALKPHOS  100   AST  20   ALT  45       Assessment/Plan:  (M62.81) Generalized muscle weakness  (primary encounter diagnosis)  (R29.6) Falls frequently  Comment: patient transferred to TCU following obs stay at [facility]. She was admitted to obs from sleep study center. She fell in sleep center. This is preceded by a " slow decline over past year with wt loss, cognitive decline in interest in socializing, decline in self care.   Plan: physical therapy and OCCUPATIONAL THERAPY     (Z86.73) History of CVA (cerebrovascular accident)  Comment: multiple Head CTs and brain MRIs suggest small vessel disease and lacunar infarct in right basal ganglia.   She has cognitive impairment but otherwise nonfocal exam  Plan: physical therapy and OCCUPATIONAL THERAPY evaluation    (R41.89) Cognitive decline  (F43.21) Adjustment disorder with depressed mood  (G47.10) Hypersomnia  Comment: unclear cause: MID vs alz ds vs depression or combination of all. She did seem better yesterday but today she continues to sleep all day  Plan: continue ritalin 2.5mg q day  (G47.33) HÉCTOR (obstructive sleep apnea)  Comment: recent sleep study suggest sleep apnea. She now has a CPAP  Plan: continue CPAP    (K58.0) Irritable bowel syndrome with diarrhea  Comment: currently has imodium available.   Plan: monitor    (I10) Essential hypertension  Comment: losartan recently reduced. BPs are better.   Plan: monitor        Electronically signed by  ZAYNAB Drake CNP

## 2017-11-16 ENCOUNTER — NURSING HOME VISIT (OUTPATIENT)
Dept: GERIATRICS | Facility: CLINIC | Age: 82
End: 2017-11-16
Payer: MEDICARE

## 2017-11-16 VITALS
BODY MASS INDEX: 25.23 KG/M2 | TEMPERATURE: 97 F | SYSTOLIC BLOOD PRESSURE: 149 MMHG | OXYGEN SATURATION: 98 % | HEIGHT: 63 IN | RESPIRATION RATE: 15 BRPM | DIASTOLIC BLOOD PRESSURE: 78 MMHG | WEIGHT: 142.4 LBS | HEART RATE: 63 BPM

## 2017-11-16 DIAGNOSIS — K58.0 IRRITABLE BOWEL SYNDROME WITH DIARRHEA: ICD-10-CM

## 2017-11-16 DIAGNOSIS — Z86.73 HISTORY OF CVA (CEREBROVASCULAR ACCIDENT): ICD-10-CM

## 2017-11-16 DIAGNOSIS — I10 ESSENTIAL HYPERTENSION: ICD-10-CM

## 2017-11-16 DIAGNOSIS — G47.33 OSA (OBSTRUCTIVE SLEEP APNEA): ICD-10-CM

## 2017-11-16 DIAGNOSIS — F43.21 ADJUSTMENT DISORDER WITH DEPRESSED MOOD: ICD-10-CM

## 2017-11-16 DIAGNOSIS — M62.81 GENERALIZED MUSCLE WEAKNESS: Primary | ICD-10-CM

## 2017-11-16 DIAGNOSIS — R41.89 COGNITIVE DECLINE: ICD-10-CM

## 2017-11-16 DIAGNOSIS — G47.10 HYPERSOMNIA: ICD-10-CM

## 2017-11-16 DIAGNOSIS — R29.6 FALLS FREQUENTLY: ICD-10-CM

## 2017-11-16 PROCEDURE — 99309 SBSQ NF CARE MODERATE MDM 30: CPT | Performed by: NURSE PRACTITIONER

## 2017-11-16 NOTE — PROGRESS NOTES
[facility]    Chief Complaint   Patient presents with     RECHECK       HPI:    Salud Arroyo is a 84 year old  (2/20/1933), who is being seen today for an episodic care visit at [place] on [facility].  HPI information obtained from: facility chart records, facility staff, patient report and [facility] chart review.    Today's concern is:      Patient transferred to TCU from obs unit at [facility]. She originally went in for a sleep study but required a few days of monitoring due to AMS, dizziness, and unwitnessed fall on 10/30 in sleep center.   Generalized muscle weakness  Falls frequently  Patient fell in obs unit. She did have brain MRI after fall, which was essentially unchanged from August 2017.   She did work with therapy while in obs unit. She can walk up to 125'.   She has been working with therapy in TCU. She is walking up to 300' with a walker.     History of CVA (cerebrovascular accident)  Cognitive decline  10/31/17 MRI of brain shows unchanged extensive chronic small vessel ischemic disease. Lacunar infarcts in right basal ganglia and thalamus and corona radiata. Probable unchanged tiny chronic left cerebellar infarct.   CPT by OCCUPATIONAL THERAPY in obs unit 4.3/5.6    She was evaluated by neurology at [facility] on 8/25/17: h/o cognitive decline since fall 2016. Decline has been gradual but as of Fall 2017 now needs help with ADLs. MMSE 26/30 with short term memory deficit. IMP: suspect Alz type dementia. Ischemic stroke 11/2016. Small vessel ischemic change of brain.    She was started on aricept on 8/25/17 and dose increased on 9/21/17.  We did stop the aricept on 11/7.       Adjustment disorder with depressed mood  She is reported to sleep up to 16h per day. She prefers to stay in bed all day and sleep. Notes indicate she is not socializing as she used to.   She is on lexapro, unclear when this was started.     hypersomonia  she prefers to stay in bed all day. She will  sleep up to 16h per day. Ritalin was started on 11/3.    HÉCTOR (obstructive sleep apnea)  She was referred to sleep lab by pulmonologist. She was diagnosed with HÉCTOR on 10/30. She does have a CPAP now but unclear if she is using it.     Irritable bowel syndrome with diarrhea  She did have a GI consult in 8/16/17. At time of this consult she had  25 lbs weight loss over the past 6 months and was having 4-6 stools per day with fecal incontinence. She was prescribed imodium.     Recent CBC,TSH CMP, Lyme. B1, B12  unremarkable. She denies abdominal discomfort, pain or bloating. She was on a probiotic but has since discontinued use. She had a hx of belching that is reported being resolved with antibiotic use.   EGD done 1/23/17: Impression:          - Small hiatus hernia.                       - Normal examined duodenum.                       - Chronic gastritis evolving to                        atrophic gastritis with no signs of                        past or present ulcer.                       - Healed mild esophagitis                       - Esophageqal dysmotility.                       - Hospital admission probably                        necessitated due to adverse reaction                        to metronidazole and/or                        clarithyomycin. Question if recent                        process is post-infectious                        gastroparesis vs functional dyspepsia  Recommendation:      - Use Prilosec (omeprazole) 20 mg PO                        daily.                       - H. pylori infection not the cause                        of symptoms. If a stronger indication                        to treat develops, would use                        alternative therapy that will need to                        be quadruple drug combo.  Essential hypertension  She continues on losartan and amlodipine.   BP: 149/78, 157/69, 117/69    ALLERGIES: Amoxicillin; Lisinopril; Ranitidine; and Zofran  "[ondansetron]  Past Medical, Surgical, Family and Social History reviewed and updated in [facility].    Current Outpatient Prescriptions   Medication Sig Dispense Refill     OMEPRAZOLE PO Take 10 mg by mouth every morning       METHYLPHENIDATE HCL PO Take 2.5 mg by mouth daily        amLODIPine (NORVASC) 10 MG tablet TAKE ONE HALF TABLET (5MG) BY MOUTH AT BEDTIME       aspirin 81 MG EC tablet Take 81 mg by mouth At Bedtime        atorvastatin (LIPITOR) 40 MG tablet Take 40 mg by mouth At Bedtime        escitalopram (LEXAPRO) 5 MG tablet Take 5 mg by mouth At Bedtime        loperamide (IMODIUM) 2 MG capsule Take 1 mg by mouth At Bedtime        losartan (COZAAR) 100 MG tablet Take 50 mg by mouth daily       Multiple Vitamins-Iron (MULTI-VITAMIN  /IRON) TABS Take 1 tablet by mouth At Bedtime        Medications reviewed:  Medications reconciled to facility chart and changes were made to reflect current medications as identified as above med list. Below are the changes that were made:   Medications stopped since last EPIC medication reconciliation:   There are no discontinued medications.    Medications started since last [facility] medication reconciliation:  No orders of the defined types were placed in this encounter.        REVIEW OF SYSTEMS:  4 point ROS including Respiratory, CV, GI and , other than that noted in the HPI,  is negative    Physical Exam:  /78  Pulse 63  Temp 97  F (36.1  C)  Resp 15  Ht 5' 3\" (1.6 m)  Wt 142 lb 6.4 oz (64.6 kg)  SpO2 98%  BMI 25.23 kg/m2  GENERAL APPEARANCE:  Alert, in no distress  ENT:  Mouth and posterior oropharynx normal, moist mucous membranes, hearing acuity adeaq   EYES:  EOM, conjunctivae, lids, pupils and irises normal  NECK:  No adenopathy,masses or thyromegaly  RESP:  respiratory effort and palpation of chest normal, no respiratory distress, Lung sounds clear  CV:  Palpation and auscultation of heart done , rate and rhythm reg, no murmur, no rub or gallop, Edema " none  ABDOMEN:  normal bowel sounds, soft, nontender, no hepatosplenomegaly or other masses  M/S:   Gait and station- steady, Digits and nails normal   SKIN:  Inspection/Palpation of skin and subcutaneous tissue no rash  NEURO: 2-12 in normal limits and at patient's baseline  PSYCH:  insight and judgement, memory impaired , affect and mood flat    Recent Labs:    CBC RESULTS:   Recent Labs   Lab Test  10/05/17   1443   WBC  6.8   RBC  5.23*   HGB  15.3   HCT  43.5   MCV  83   MCH  29.3   MCHC  35.2   RDW  14.0   PLT  286       Last Basic Metabolic Panel:  Recent Labs   Lab Test  10/05/17   1443   NA  136   POTASSIUM  3.8   CHLORIDE  100   PELON  10.2*   CO2  23   BUN  10   CR  0.73   GLC  147*       Liver Function Studies -   Recent Labs   Lab Test  10/05/17   1443   PROTTOTAL  8.4   ALBUMIN  4.1   BILITOTAL  1.0   ALKPHOS  100   AST  20   ALT  45         Assessment/Plan:  (M62.81) Generalized muscle weakness  (primary encounter diagnosis)  (R29.6) Falls frequently  Comment: patient transferred to TCU following obs stay at [facility]. She was admitted to obs from sleep study center.  This is preceded by a slow decline over past year with wt loss, cognitive decline in interest in socializing, decline in self care. She is cooperating with therapy and walking up to 300' with walker   Plan: physical therapy and OCCUPATIONAL THERAPY     (Z86.73) History of CVA (cerebrovascular accident)  Comment: multiple Head CTs and brain MRIs suggest small vessel disease and lacunar infarct in right basal ganglia.   She has cognitive impairment but otherwise nonfocal exam  Plan: physical therapy and OCCUPATIONAL THERAPY    (R41.89) Cognitive decline  (F43.21) Adjustment disorder with depressed mood  (G47.10) Hypersomnia  Comment: unclear cause: MID vs alz ds vs depression or combination of all. She seems to be more alert since ritalin started.   Plan: continue ritalin 2.5mg q day  (G47.33) ÉHCTOR (obstructive sleep apnea)  Comment: recent  sleep study suggest sleep apnea. She now has a CPAP  Plan: continue CPAP    (K58.0) Irritable bowel syndrome with diarrhea  Comment: currently has imodium available.   Plan: monitor    (I10) Essential hypertension  Comment: losartan recently reduced. BPs are better.   Plan: monitor        Electronically signed by  ZAYNAB Drake CNP    [phone]

## 2017-11-20 ENCOUNTER — DISCHARGE SUMMARY NURSING HOME (OUTPATIENT)
Dept: GERIATRICS | Facility: CLINIC | Age: 82
End: 2017-11-20
Payer: MEDICARE

## 2017-11-20 VITALS
TEMPERATURE: 97.1 F | RESPIRATION RATE: 17 BRPM | SYSTOLIC BLOOD PRESSURE: 111 MMHG | WEIGHT: 143 LBS | BODY MASS INDEX: 25.34 KG/M2 | DIASTOLIC BLOOD PRESSURE: 61 MMHG | OXYGEN SATURATION: 98 % | HEIGHT: 63 IN | HEART RATE: 60 BPM

## 2017-11-20 DIAGNOSIS — R29.6 FALLS FREQUENTLY: ICD-10-CM

## 2017-11-20 DIAGNOSIS — R41.89 COGNITIVE DECLINE: ICD-10-CM

## 2017-11-20 DIAGNOSIS — Z86.73 HISTORY OF CVA (CEREBROVASCULAR ACCIDENT): ICD-10-CM

## 2017-11-20 DIAGNOSIS — F43.21 ADJUSTMENT DISORDER WITH DEPRESSED MOOD: ICD-10-CM

## 2017-11-20 DIAGNOSIS — G47.10 HYPERSOMNIA: ICD-10-CM

## 2017-11-20 DIAGNOSIS — G47.33 OSA (OBSTRUCTIVE SLEEP APNEA): ICD-10-CM

## 2017-11-20 DIAGNOSIS — I10 ESSENTIAL HYPERTENSION: ICD-10-CM

## 2017-11-20 DIAGNOSIS — K58.0 IRRITABLE BOWEL SYNDROME WITH DIARRHEA: ICD-10-CM

## 2017-11-20 DIAGNOSIS — M62.81 GENERALIZED MUSCLE WEAKNESS: Primary | ICD-10-CM

## 2017-11-20 PROCEDURE — 99316 NF DSCHRG MGMT 30 MIN+: CPT | Performed by: NURSE PRACTITIONER

## 2017-11-20 NOTE — LETTER
11/20/2017        RE: Salud joseph [facility]  [address]  [address]  [place] [address]          [facility] DISCHARGE SUMMARY    PATIENT'S NAME: Salud Arroyo  YOB: 1933  MEDICAL RECORD NUMBER:  [id number]    PRIMARY CARE PROVIDER AND CLINIC RESPONSIBLE AFTER TRANSFER: Armando Torres [address] / [place] [address]     CODE STATUS/ADVANCE DIRECTIVES DISCUSSION:   CPR/Full code        Allergies   Allergen Reactions     Amoxicillin Nausea     Lisinopril Cough     Ranitidine      Zofran [Ondansetron]        TRANSFERRING PROVIDERS: ZAYNAB Drake CNP, Sandra Bravo MD  DATE OF SNF ADMISSION:  November / 02 / 2017  DATE OF SNF (anticipated) DISCHARGE: November / 22 / 2017  DISCHARGE DISPOSITION: Assisted Living: [facility] [place]   Nursing Facility: [facility] [place] stay 10/30/2017 to 11/02/2017.     Condition on Discharge:  Improving.  Function:  Walking length of hallway with walker  Cognitive Scores: CPT 5.1/5.6    Equipment: walker    DISCHARGE DIAGNOSIS:   1. Generalized muscle weakness    2. Falls frequently    3. History of CVA (cerebrovascular accident)    4. Cognitive decline    5. Adjustment disorder with depressed mood    6. Hypersomnia    7. HÉCTOR (obstructive sleep apnea)    8. Irritable bowel syndrome with diarrhea    9. Essential hypertension        HPI Nursing Facility Course:  HPI information obtained from: facility chart records, facility staff, patient report and [facility] chart review.        Patient transferred to TCU from obs unit at [facility]. She originally went in for a sleep study but required a few days of monitoring due to AMS, dizziness, and unwitnessed fall on 10/30 in sleep center.     Generalized muscle weakness  Falls frequently  Patient fell in obs unit. She did have brain MRI after fall, which was essentially unchanged from August 2017.   She has been working with therapy in  TCU. She is walking up to 300' with a walker.     History of CVA (cerebrovascular accident)  Cognitive decline  10/31/17 MRI of brain shows unchanged extensive chronic small vessel ischemic disease. Lacunar infarcts in right basal ganglia and thalamus and corona radiata. Probable unchanged tiny chronic left cerebellar infarct.   CPT by OCCUPATIONAL THERAPY in obs unit 4.3/5.6    She was evaluated by neurology at [facility] on 8/25/17: h/o cognitive decline since fall 2016. Decline has been gradual but as of Fall 2017 now needs help with ADLs. MMSE 26/30 with short term memory deficit. IMP: suspect Alz type dementia. Ischemic stroke 11/2016. Small vessel ischemic change of brain.    She was started on aricept on 8/25/17 and dose increased on 9/21/17.  We did stop the aricept on 11/7.       Adjustment disorder with depressed mood  She is reported to sleep up to 16h per day. She prefers to stay in bed all day and sleep. Notes indicate she is not socializing as she used to.   She is on lexapro, unclear when this was started.     hypersomonia  she prefers to stay in bed all day. She will sleep up to 16h per day. Ritalin was started on 11/3. She does seem to be more alert since starting ritalin.     HÉCTOR (obstructive sleep apnea)  She was referred to sleep lab by pulmonologist. She was diagnosed with HÉCTOR on 10/30. She does have a CPAP now but unclear if she is using it.     Irritable bowel syndrome with diarrhea  She did have a GI consult in 8/16/17. At time of this consult she had  25 lbs weight loss over the past 6 months and was having 4-6 stools per day with fecal incontinence. She was prescribed imodium.     Recent CBC,TSH CMP, Lyme. B1, B12  unremarkable. She denies abdominal discomfort, pain or bloating. She was on a probiotic but has since discontinued use. She had a hx of belching that is reported being resolved with antibiotic use.   EGD done 1/23/17: Impression:          - Small hiatus  hernia.                       - Normal examined duodenum.                       - Chronic gastritis evolving to                        atrophic gastritis with no signs of                        past or present ulcer.                       - Healed mild esophagitis                       - Esophageqal dysmotility.                       - Hospital admission probably                        necessitated due to adverse reaction                        to metronidazole and/or                        clarithyomycin. Question if recent                        process is post-infectious                        gastroparesis vs functional dyspepsia  Recommendation:      - Use Prilosec (omeprazole) 20 mg PO                        daily.                       - H. pylori infection not the cause                        of symptoms. If a stronger indication                        to treat develops, would use                        alternative therapy that will need to                        be quadruple drug combo.  Essential hypertension  She continues on losartan and amlodipine.   BP: 111/61, 153/77, 167/75    PAST MEDICAL HISTORY:  has a past medical history of Cancer (H); Cataract; Granuloma annulare; HTN (hypertension); Hyperlipidemia; IFG (impaired fasting glucose); Irritable bowel syndrome; Osteopenia; and Uveitis.    DISCHARGE MEDICATIONS:  Current Outpatient Prescriptions   Medication Sig Dispense Refill     OMEPRAZOLE PO Take 10 mg by mouth every morning       METHYLPHENIDATE HCL PO Take 2.5 mg by mouth daily        amLODIPine (NORVASC) 10 MG tablet TAKE ONE HALF TABLET (5MG) BY MOUTH AT BEDTIME       aspirin 81 MG EC tablet Take 81 mg by mouth At Bedtime        atorvastatin (LIPITOR) 40 MG tablet Take 40 mg by mouth At Bedtime        escitalopram (LEXAPRO) 5 MG tablet Take 5 mg by mouth At Bedtime        loperamide (IMODIUM) 2 MG capsule Take 1 mg by mouth At Bedtime        losartan (COZAAR) 100 MG tablet Take 50 mg by mouth  "daily       Multiple Vitamins-Iron (MULTI-VITAMIN  /IRON) TABS Take 1 tablet by mouth At Bedtime          MEDICATION CHANGES/RATIONALE:   11/3/17 start ritalin 2.5mg q am  11/7 decrease amlodipine 5mg q day, DISCONTINUE aricept, start prilosec 10mg  q day  Controlled medications sent with patient: Script for ritalin 2.5mg medication for 30 tabs and 0 refills given to patient at dischage to have them fill at their out patient pharmacy     ROS:    4 point ROS including Respiratory, CV, GI and , other than that noted in the HPI,  is negative    Physical Exam:   Vitals: /61  Pulse 60  Temp 97.1  F (36.2  C)  Resp 17  Ht 5' 3\" (1.6 m)  Wt 143 lb (64.9 kg)  SpO2 98%  BMI 25.33 kg/m2  BMI= Body mass index is 25.33 kg/(m^2).    GENERAL APPEARANCE:  Alert, in no distress  ENT:  Mouth and posterior oropharynx normal, moist mucous membranes, hearing acuity adeaq   EYES:  EOM, conjunctivae, lids, pupils and irises normal  NECK:  No adenopathy,masses or thyromegaly  RESP:  respiratory effort and palpation of chest normal, no respiratory distress, Lung sounds clear  CV:  Palpation and auscultation of heart done , rate and rhythm reg, no murmur, no rub or gallop, Edema none  ABDOMEN:  normal bowel sounds, soft, nontender, no hepatosplenomegaly or other masses  M/S:   Gait and station- steady, Digits and nails normal   SKIN:  Inspection/Palpation of skin and subcutaneous tissue no rash  NEURO: 2-12 in normal limits and at patient's baseline  PSYCH:  insight and judgement, memory impaired , affect and mood flat    DISCHARGE PLAN:  Occupational Therapy, Physical Therapy, Home Health Aide and From:  [facility]  Patient instructed to follow-up with:  PCP in 7 days      Current [place] scheduled appointments:  No future appointments.    MTM referral needed and placed by this provider: No    Pending labs: none  SNF labs   [place] labs:  CBC w/Diff (10/30/2017 6:54 PM)  CBC w/Diff (10/30/2017 6:54 PM)   Component " Value Ref Range   White Blood Cell Count 6.7 3.8 - 11.0 k/cmm   Red Blood Cell Count 4.83 3.70 - 5.20 m/cmm   Hemoglobin 14.1 11.8 - 15.5 g/dL   Hematocrit 41.3 35.0 - 46.0 %   Mean Corpuscular Volume 85.5 80.0 - 100.0 fL   RDW 13.2 11.0 - 15.0 %   Platelet Count 259 140 - 450 k/cmm   Basic Metabolic Panel (10/30/2017 6:54 PM)  Basic Metabolic Panel (10/30/2017 6:54 PM)   Component Value Ref Range   Creatinine Serum 0.79 0.55 - 1.02 mg/dL   Lab Glucose 106 (H)  Comment:   The stated glucose range is for the fasting state.  Non-fasting glucose range is  mg/dL     70 - 100 mg/dL   CO2 25 22 - 31 mmol/L   Chloride 104 98 - 109 mmol/L   Potassium 3.9 3.5 - 5.2 mmol/L   Sodium 140 136 - 145 mmol/L   Blood Urea Nitrogen 11 9 - 26 mg/dL   Calcium 10.0 8.4 - 10.2 mg/dL   Est GFR African Am >60 >60 mL/min/1.73m2   Est GFR Non-Afr Am >60  Comment:   Normal>60, moderate decrease 30 - 59, severe decrease 15 - 29,  renal failure <15 mL/min/1.73 m2  NOTE:  Choose the eGFR result above appropriate for the race of the patient.     >60 mL/min/1.73m2     [place] labs:  CBC RESULTS:   Recent Labs   Lab Test  10/05/17   1443   WBC  6.8   RBC  5.23*   HGB  15.3   HCT  43.5   MCV  83   MCH  29.3   MCHC  35.2   RDW  14.0   PLT  286       Last Basic Metabolic Panel:  Recent Labs   Lab Test  10/05/17   1443   NA  136   POTASSIUM  3.8   CHLORIDE  100   PELON  10.2*   CO2  23   BUN  10   CR  0.73   GLC  147*       Liver Function Studies -   Recent Labs   Lab Test  10/05/17   1443   PROTTOTAL  8.4   ALBUMIN  4.1   BILITOTAL  1.0   ALKPHOS  100   AST  20   ALT  45       Discharge Treatments:none    TOTAL DISCHARGE TIME:   Greater than 30 minutes  Electronically signed by:  ZAYNAB Drake CNP      Sincerely,        ZAYNAB Drake CNP

## 2017-11-20 NOTE — PROGRESS NOTES
[place] [facility] DISCHARGE SUMMARY    PATIENT'S NAME: Salud Arroyo  YOB: 1933  MEDICAL RECORD NUMBER:  [id number]    PRIMARY CARE PROVIDER AND CLINIC RESPONSIBLE AFTER TRANSFER: Armando Torres [address] / [place] [address]     CODE STATUS/ADVANCE DIRECTIVES DISCUSSION:   CPR/Full code        Allergies   Allergen Reactions     Amoxicillin Nausea     Lisinopril Cough     Ranitidine      Zofran [Ondansetron]        TRANSFERRING PROVIDERS: ZAYNAB Drake CNP, Sandra Bravo MD  DATE OF SNF ADMISSION:  November / 02 / 2017  DATE OF SNF (anticipated) DISCHARGE: November / 22 / 2017  DISCHARGE DISPOSITION: Assisted Living: [facility]   Nursing Facility: [facility]   Hospital  [facility] stay 10/30/2017 to 11/02/2017.     Condition on Discharge:  Improving.  Function:  Walking length of hallway with walker  Cognitive Scores: CPT 5.1/5.6    Equipment: walker    DISCHARGE DIAGNOSIS:   1. Generalized muscle weakness    2. Falls frequently    3. History of CVA (cerebrovascular accident)    4. Cognitive decline    5. Adjustment disorder with depressed mood    6. Hypersomnia    7. HÉCTOR (obstructive sleep apnea)    8. Irritable bowel syndrome with diarrhea    9. Essential hypertension        HPI Nursing Facility Course:  HPI information obtained from: facility chart records, facility staff, patient report and [facility] chart review.        Patient transferred to TCU from obs unit at [facility]. She originally went in for a sleep study but required a few days of monitoring due to AMS, dizziness, and unwitnessed fall on 10/30 in sleep center.     Generalized muscle weakness  Falls frequently  Patient fell in obs unit. She did have brain MRI after fall, which was essentially unchanged from August 2017.   She has been working with therapy in TCU. She is walking up to 300' with a walker.     History of CVA (cerebrovascular accident)  Cognitive  decline  10/31/17 MRI of brain shows unchanged extensive chronic small vessel ischemic disease. Lacunar infarcts in right basal ganglia and thalamus and corona radiata. Probable unchanged tiny chronic left cerebellar infarct.   CPT by OCCUPATIONAL THERAPY in obs unit 4.3/5.6    She was evaluated by neurology at [facility] on 8/25/17: h/o cognitive decline since fall 2016. Decline has been gradual but as of Fall 2017 now needs help with ADLs. MMSE 26/30 with short term memory deficit. IMP: suspect Alz type dementia. Ischemic stroke 11/2016. Small vessel ischemic change of brain.    She was started on aricept on 8/25/17 and dose increased on 9/21/17.  We did stop the aricept on 11/7.       Adjustment disorder with depressed mood  She is reported to sleep up to 16h per day. She prefers to stay in bed all day and sleep. Notes indicate she is not socializing as she used to.   She is on lexapro, unclear when this was started.     hypersomonia  she prefers to stay in bed all day. She will sleep up to 16h per day. Ritalin was started on 11/3. She does seem to be more alert since starting ritalin.     HÉCTOR (obstructive sleep apnea)  She was referred to sleep lab by pulmonologist. She was diagnosed with HÉCTOR on 10/30. She does have a CPAP now but unclear if she is using it.     Irritable bowel syndrome with diarrhea  She did have a GI consult in 8/16/17. At time of this consult she had  25 lbs weight loss over the past 6 months and was having 4-6 stools per day with fecal incontinence. She was prescribed imodium.     Recent CBC,TSH CMP, Lyme. B1, B12  unremarkable. She denies abdominal discomfort, pain or bloating. She was on a probiotic but has since discontinued use. She had a hx of belching that is reported being resolved with antibiotic use.   EGD done 1/23/17: Impression:          - Small hiatus hernia.                       - Normal examined duodenum.                       - Chronic gastritis evolving to                         atrophic gastritis with no signs of                        past or present ulcer.                       - Healed mild esophagitis                       - Esophageqal dysmotility.                       - Hospital admission probably                        necessitated due to adverse reaction                        to metronidazole and/or                        clarithyomycin. Question if recent                        process is post-infectious                        gastroparesis vs functional dyspepsia  Recommendation:      - Use Prilosec (omeprazole) 20 mg PO                        daily.                       - H. pylori infection not the cause                        of symptoms. If a stronger indication                        to treat develops, would use                        alternative therapy that will need to                        be quadruple drug combo.  Essential hypertension  She continues on losartan and amlodipine.   BP: 111/61, 153/77, 167/75    PAST MEDICAL HISTORY:  has a past medical history of Cancer (H); Cataract; Granuloma annulare; HTN (hypertension); Hyperlipidemia; IFG (impaired fasting glucose); Irritable bowel syndrome; Osteopenia; and Uveitis.    DISCHARGE MEDICATIONS:  Current Outpatient Prescriptions   Medication Sig Dispense Refill     OMEPRAZOLE PO Take 10 mg by mouth every morning       METHYLPHENIDATE HCL PO Take 2.5 mg by mouth daily        amLODIPine (NORVASC) 10 MG tablet TAKE ONE HALF TABLET (5MG) BY MOUTH AT BEDTIME       aspirin 81 MG EC tablet Take 81 mg by mouth At Bedtime        atorvastatin (LIPITOR) 40 MG tablet Take 40 mg by mouth At Bedtime        escitalopram (LEXAPRO) 5 MG tablet Take 5 mg by mouth At Bedtime        loperamide (IMODIUM) 2 MG capsule Take 1 mg by mouth At Bedtime        losartan (COZAAR) 100 MG tablet Take 50 mg by mouth daily       Multiple Vitamins-Iron (MULTI-VITAMIN  /IRON) TABS Take 1 tablet by mouth At Bedtime     "      MEDICATION CHANGES/RATIONALE:   11/3/17 start ritalin 2.5mg q am  11/7 decrease amlodipine 5mg q day, DISCONTINUE aricept, start prilosec 10mg  q day  Controlled medications sent with patient: Script for ritalin 2.5mg medication for 30 tabs and 0 refills given to patient at dischage to have them fill at their out patient pharmacy     ROS:    4 point ROS including Respiratory, CV, GI and , other than that noted in the HPI,  is negative    Physical Exam:   Vitals: /61  Pulse 60  Temp 97.1  F (36.2  C)  Resp 17  Ht 5' 3\" (1.6 m)  Wt 143 lb (64.9 kg)  SpO2 98%  BMI 25.33 kg/m2  BMI= Body mass index is 25.33 kg/(m^2).    GENERAL APPEARANCE:  Alert, in no distress  ENT:  Mouth and posterior oropharynx normal, moist mucous membranes, hearing acuity adeaq   EYES:  EOM, conjunctivae, lids, pupils and irises normal  NECK:  No adenopathy,masses or thyromegaly  RESP:  respiratory effort and palpation of chest normal, no respiratory distress, Lung sounds clear  CV:  Palpation and auscultation of heart done , rate and rhythm reg, no murmur, no rub or gallop, Edema none  ABDOMEN:  normal bowel sounds, soft, nontender, no hepatosplenomegaly or other masses  M/S:   Gait and station- steady, Digits and nails normal   SKIN:  Inspection/Palpation of skin and subcutaneous tissue no rash  NEURO: 2-12 in normal limits and at patient's baseline  PSYCH:  insight and judgement, memory impaired , affect and mood flat    DISCHARGE PLAN:  Occupational Therapy, Physical Therapy, Home Health Aide and From:  [facility]  Patient instructed to follow-up with:  PCP in 7 days      Current [place] scheduled appointments:  No future appointments.    MTM referral needed and placed by this provider: No    Pending labs: none  SNF labs   [facility] labs:  CBC w/Diff (10/30/2017 6:54 PM)  CBC w/Diff (10/30/2017 6:54 PM)   Component Value Ref Range   White Blood Cell Count 6.7 3.8 - 11.0 k/cmm   Red Blood Cell Count 4.83 3.70 - " 5.20 m/cmm   Hemoglobin 14.1 11.8 - 15.5 g/dL   Hematocrit 41.3 35.0 - 46.0 %   Mean Corpuscular Volume 85.5 80.0 - 100.0 fL   RDW 13.2 11.0 - 15.0 %   Platelet Count 259 140 - 450 k/cmm   Basic Metabolic Panel (10/30/2017 6:54 PM)  Basic Metabolic Panel (10/30/2017 6:54 PM)   Component Value Ref Range   Creatinine Serum 0.79 0.55 - 1.02 mg/dL   Lab Glucose 106 (H)  Comment:   The stated glucose range is for the fasting state.  Non-fasting glucose range is  mg/dL     70 - 100 mg/dL   CO2 25 22 - 31 mmol/L   Chloride 104 98 - 109 mmol/L   Potassium 3.9 3.5 - 5.2 mmol/L   Sodium 140 136 - 145 mmol/L   Blood Urea Nitrogen 11 9 - 26 mg/dL   Calcium 10.0 8.4 - 10.2 mg/dL   Est GFR African Am >60 >60 mL/min/1.73m2   Est GFR Non-Afr Am >60  Comment:   Normal>60, moderate decrease 30 - 59, severe decrease 15 - 29,  renal failure <15 mL/min/1.73 m2  NOTE:  Choose the eGFR result above appropriate for the race of the patient.     >60 mL/min/1.73m2     [place] labs:  CBC RESULTS:   Recent Labs   Lab Test  10/05/17   1443   WBC  6.8   RBC  5.23*   HGB  15.3   HCT  43.5   MCV  83   MCH  29.3   MCHC  35.2   RDW  14.0   PLT  286       Last Basic Metabolic Panel:  Recent Labs   Lab Test  10/05/17   1443   NA  136   POTASSIUM  3.8   CHLORIDE  100   PELON  10.2*   CO2  23   BUN  10   CR  0.73   GLC  147*       Liver Function Studies -   Recent Labs   Lab Test  10/05/17   1443   PROTTOTAL  8.4   ALBUMIN  4.1   BILITOTAL  1.0   ALKPHOS  100   AST  20   ALT  45       Discharge Treatments:none    TOTAL DISCHARGE TIME:   Greater than 30 minutes  Electronically signed by:  ZAYNAB Drake CNP

## 2017-11-22 ENCOUNTER — TELEPHONE (OUTPATIENT)
Dept: GERIATRICS | Facility: CLINIC | Age: 82
End: 2017-11-22

## 2017-11-22 NOTE — TELEPHONE ENCOUNTER
[place] [facility] TELEPHONE ENCOUNTER    Chief Complaint   Patient presents with     oxygen       Salud Arroyo is a 84 year old  (2/20/1933),Nurse called today to report: patient is discharging today to the [facility].  She has been using O2 at night.  O2 sats have been > 90% on room air when spot checked.  She has DX of sleep apnea and has been fitted with a CPap. The O2 is plugged into the CPAP.  She has informed staff she will not use the CPAP all the time.  Nurse Manager wondering if the oxygen needs to be ordered.  He then spoke with the Pulmonologist who said since patient will not consistently use the oxygen, may just as well discontinue it.     ASSESSMENT/PLAN  Sleep Apnea    Will not send home with supplemental oxygen due to non-use and non-coverage    ZAYNAB Carty CNP

## 2017-11-29 ENCOUNTER — ASSISTED LIVING VISIT (OUTPATIENT)
Dept: GERIATRICS | Facility: CLINIC | Age: 82
End: 2017-11-29
Payer: MEDICARE

## 2017-11-29 DIAGNOSIS — F43.21 ADJUSTMENT DISORDER WITH DEPRESSED MOOD: Primary | ICD-10-CM

## 2017-11-29 DIAGNOSIS — K58.0 IRRITABLE BOWEL SYNDROME WITH DIARRHEA: ICD-10-CM

## 2017-11-29 DIAGNOSIS — G47.10 HYPERSOMNIA: ICD-10-CM

## 2017-11-29 DIAGNOSIS — R41.89 COGNITIVE DECLINE: ICD-10-CM

## 2017-11-29 DIAGNOSIS — G47.33 OSA (OBSTRUCTIVE SLEEP APNEA): ICD-10-CM

## 2017-11-29 NOTE — PROGRESS NOTES
[place] [facility]    Chief Complaint   Patient presents with     RECHECK       HPI:    Salud Arroyo is a 84 year old  (2/20/1933), who is being seen today for an episodic care visit at [facility].  HPI information obtained from: patient report, [facility] chart review and family/first contact Tj report.Today's concern is:    Adjustment disorder with depressed mood  Hypersomnia  Cognitive decline  Recent hx of staying in bed all day- 16 hours daily.Family reports she is much improved. She is making lunch plans and seeing friends. Started on Ritalin 11/3.  CPT score on 5.1/5.6. Hx of CVA with 10/31/17 MRI showing extensive chronic small vessel ischemic changes. Lacunar infarcts in right basal ganglia, thalamus and corona radiata. Tiny chronic left cerebellar infarct. MMSE 26/30 with short term memory deficit. Was not tolerating Aricept so was d/c'd on 11/7/17.    HÉCTOR (obstructive sleep apnea)  Dx 10/30/17. Attempted CPAP but unable to continue use.    Irritable bowel syndrome with diarrhea  Family reports on episode on 11/25/17 of incontinence with stool but she has no memory of this. GI consult 8/2017 with a 25 lbs weight loss over 6 months and 4-6 loose stools daily. Labs unremarkable. Controlled with imodium. Discontinued probiotic use. Hx of belching that resolved with antibiotic use.      ALLERGIES: Amoxicillin; Lisinopril; Ranitidine; and Zofran [ondansetron]  Past Medical, Surgical, Family and Social History reviewed and updated in [facility].    Current Outpatient Prescriptions   Medication Sig Dispense Refill     OMEPRAZOLE PO Take 10 mg by mouth every morning       METHYLPHENIDATE HCL PO Take 2.5 mg by mouth daily        amLODIPine (NORVASC) 10 MG tablet TAKE ONE HALF TABLET (5MG) BY MOUTH AT BEDTIME       aspirin 81 MG EC tablet Take 81 mg by mouth At Bedtime        atorvastatin (LIPITOR) 40 MG tablet Take 40 mg by mouth At Bedtime        escitalopram (LEXAPRO) 5 MG  tablet Take 5 mg by mouth At Bedtime        loperamide (IMODIUM) 2 MG capsule Take 1 mg by mouth At Bedtime        losartan (COZAAR) 100 MG tablet Take 50 mg by mouth daily       Multiple Vitamins-Iron (MULTI-VITAMIN  /IRON) TABS Take 1 tablet by mouth At Bedtime        Patient Active Problem List   Diagnosis     History of CVA (cerebrovascular accident)     Nausea and vomiting     Generalized muscle weakness     Falls frequently     Hyperglycemia     History of basal cell carcinoma     Osteopenia of multiple sites     Anxiety     Actinic keratosis     Intestinal disaccharidase deficiencies and disaccharide malabsorption     Osteoarthritis of multiple joints     Prolapse of vaginal wall     Erythematous condition     Hyperlipidemia     Irritable bowel syndrome     Essential hypertension     IFG (impaired fasting glucose)     Granuloma annulare     Hypersomnia     Hypokalemia     Adjustment disorder with depressed mood     HÉCTOR (obstructive sleep apnea)     Cognitive decline       Medications reviewed:  Medications reconciled to facility chart and changes were made to reflect current medications as identified as above med list. Below are the changes that were made:   Medications stopped since last EPIC medication reconciliation:   There are no discontinued medications.    Medications started since last [facility] medication reconciliation:  No orders of the defined types were placed in this encounter.    REVIEW OF SYSTEMS:  4 point ROS including Respiratory, CV, GI and , other than that noted in the HPI,  is negative    Physical Exam:  /72  Pulse 62  Temp 97.8  F (36.6  C)  Resp 16  SpO2 98%  GENERAL APPEARANCE:  Alert, in no distress, cooperative and smiling  ENT:  Mouth and posterior oropharynx normal, moist mucous membranes  EYES:  EOM, conjunctivae, lids, pupils and irises normal  NECK:  No adenopathy,masses or thyromegaly  RESP:  respiratory effort and palpation of chest normal, lungs clear to auscultation    CV:  Palpation and auscultation of heart done , regular rate and rhythm, no murmur, rub, or gallop, no edema  ABDOMEN:  normal bowel sounds, soft, nontender  M/S:   Gait and Station at baseline  SKIN:  Inspection of skin and subcutaneous tissue baseline  NEURO:   Cranial nerves 2-12 are normal tested and grossly at patient's baseline  PSYCH:  oriented X 3 but forgetful.      Recent Labs:    CBC RESULTS:   Recent Labs   Lab Test  10/05/17   1443   WBC  6.8   RBC  5.23*   HGB  15.3   HCT  43.5   MCV  83   MCH  29.3   MCHC  35.2   RDW  14.0   PLT  286       Last Basic Metabolic Panel:  Recent Labs   Lab Test  10/05/17   1443   NA  136   POTASSIUM  3.8   CHLORIDE  100   PELON  10.2*   CO2  23   BUN  10   CR  0.73   GLC  147*       Liver Function Studies -   Recent Labs   Lab Test  10/05/17   1443   PROTTOTAL  8.4   ALBUMIN  4.1   BILITOTAL  1.0   ALKPHOS  100   AST  20   ALT  45       Assessment/Plan:  (F43.21) Adjustment disorder with depressed mood  (primary encounter diagnosis)  (G47.10) Hypersomnia  (R41.89) Cognitive decline  Comment: Improving  Plan:   -Continue with current plan, consider increase to Ritalin per TCU NP  -Consider a more activating antidepressive medication in the am versus HS  -Continue escort services to meals from AL staff    (G47.33) HÉCTOR (obstructive sleep apnea)  Comment: New Dx  Plan:  -Unable to use CPAP  -Monitor O2 sats  -Sleep with HOB elevated    (K58.0) Irritable bowel syndrome with diarrhea  Comment: Ongoing but better controlled  Plan:   -Continue with current plan of care  -Could add additional dose if needed        Electronically signed by  ZAYNAB Hale CNP

## 2017-12-02 VITALS
TEMPERATURE: 97.8 F | SYSTOLIC BLOOD PRESSURE: 120 MMHG | DIASTOLIC BLOOD PRESSURE: 72 MMHG | OXYGEN SATURATION: 98 % | HEART RATE: 62 BPM | RESPIRATION RATE: 16 BRPM

## 2017-12-05 DIAGNOSIS — K21.9 GASTROESOPHAGEAL REFLUX DISEASE WITHOUT ESOPHAGITIS: Primary | ICD-10-CM

## 2017-12-05 RX ORDER — OMEPRAZOLE 10 MG/1
10 CAPSULE, DELAYED RELEASE ORAL EVERY MORNING
Qty: 31 CAPSULE | Refills: 98 | Status: SHIPPED | OUTPATIENT
Start: 2017-12-05 | End: 2018-12-28

## 2017-12-12 VITALS — DIASTOLIC BLOOD PRESSURE: 74 MMHG | SYSTOLIC BLOOD PRESSURE: 121 MMHG | HEART RATE: 71 BPM

## 2017-12-13 ENCOUNTER — ASSISTED LIVING VISIT (OUTPATIENT)
Dept: GERIATRICS | Facility: CLINIC | Age: 82
End: 2017-12-13
Payer: MEDICARE

## 2017-12-13 DIAGNOSIS — F01.50 MIXED ALZHEIMER'S AND VASCULAR DEMENTIA (H): ICD-10-CM

## 2017-12-13 DIAGNOSIS — R10.13 DYSPEPSIA: ICD-10-CM

## 2017-12-13 DIAGNOSIS — I10 ESSENTIAL HYPERTENSION: ICD-10-CM

## 2017-12-13 DIAGNOSIS — F02.80 MIXED ALZHEIMER'S AND VASCULAR DEMENTIA (H): ICD-10-CM

## 2017-12-13 DIAGNOSIS — G30.9 MIXED ALZHEIMER'S AND VASCULAR DEMENTIA (H): ICD-10-CM

## 2017-12-13 DIAGNOSIS — F43.21 ADJUSTMENT DISORDER WITH DEPRESSED MOOD: ICD-10-CM

## 2017-12-13 DIAGNOSIS — I67.9 CEREBRAL VASCULAR DISEASE: ICD-10-CM

## 2017-12-13 DIAGNOSIS — G47.10 HYPERSOMNIA: Primary | ICD-10-CM

## 2017-12-20 NOTE — PROGRESS NOTES
Salud Arroyo is a 84 year old female seen December 19, 2017 at [facility] where she has resided for 6 months (admit 6/2017) seen to follow up hypersomnolence, dementia and HTN.   Patient was discharged from TCU last month where she had seen some improvement in mobility and endurance.   She was started on Ritalin for her hypersomnia, and that has seemed to be effective.     See 11/7 note for h/o current issues.     Today she is seen in her apartment, in bed late morning, but awake and answers questions.   States she feels well, denies pain or other symptoms.   Feels she has improved.   Says no to any pain, dyspnea or GI sx    Past Medical History:   Diagnosis Date     Cancer (H)      Cataract      Granuloma annulare      HTN (hypertension)      Hyperlipidemia      IFG (impaired fasting glucose)      Irritable bowel syndrome      Osteopenia      Uveitis         SH:   , lives alone at [facility].   She has 3 children        ROS: negative other than above, although limited history.   CPT 4.3 -5.1  BP Readings from Last 3 Encounters:   12/11/17 121/74   11/28/17 120/72   11/20/17 111/61        EXAM:  Pleasant, NAD  /74  Pulse 71   Neck supple without adenopathy  Lungs with decreased BS, no rales or wheeze  Heart RRR s1s2 distant  Abd soft, NT, no distention, +BS  Ext without edema  Neuro: non-focal, no tremor or stiffness  Psych: affect guarded.     No recent labs    IMP/PLAN:  (G47.10) Hypersomnia   Comment: multifactorial and including HÉCTOR, medications, depression and loss of initiation from dementia   Plan: continue Ritalin as she has seemed to have some response    Could increase dose to 5 mg/day if symptoms worsen again     (M62.81) Generalized muscle weakness  Comment: deconditioning  Plan: PHYSICAL THERAPY / OCCUPATIONAL THERAPY for strengthening, endurance, balance, gait, ADLs.      (F43.21) Adjustment disorder with depressed mood  Comment: family concerned that she is giving up.     Plan:  continue escitalopram  In house Psychologist to follow.       (G47.33) HÉCTOR (obstructive sleep apnea)  Comment: on CPAP  Plan: encourage nightly use for as many hours as possible    (Z86.73) History of CVA (cerebrovascular accident)  Comment: by MRI results  Plan: ASA, statin, bp meds for secondary prevention.        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     Plan: AL support for meds, meals, activity    (R10.13) Dyspepsia  Comment: recurrent GI symptoms, possible IBS.    Now quiet, may have been related to donepezil  Plan: continue omeprazole at 10 mg/day       (I10) Essential HTN  Comment: good control on lower doses of medications  Plan: continue losartan, amlodipine.  Consider further dose reduction if SBPs consistently <125      Sandra Bravo MD

## 2018-01-02 DIAGNOSIS — F43.21 ADJUSTMENT DISORDER WITH DEPRESSED MOOD: Primary | ICD-10-CM

## 2018-01-02 RX ORDER — ESCITALOPRAM OXALATE 5 MG/1
5 TABLET ORAL DAILY
Qty: 31 TABLET | Refills: 98 | Status: SHIPPED | OUTPATIENT
Start: 2018-01-02 | End: 2018-04-18

## 2018-01-29 ENCOUNTER — HOSPITAL ENCOUNTER (OUTPATIENT)
Dept: LAB | Facility: CLINIC | Age: 83
Discharge: HOME | End: 2018-01-29
Attending: NURSE PRACTITIONER | Admitting: NURSE PRACTITIONER
Payer: MEDICARE

## 2018-01-29 LAB
ALBUMIN UR-MCNC: 10 MG/DL
APPEARANCE UR: ABNORMAL
BACTERIA #/AREA URNS HPF: ABNORMAL /HPF
BILIRUB UR QL STRIP: NEGATIVE
COLOR UR AUTO: YELLOW
GLUCOSE UR STRIP-MCNC: NEGATIVE MG/DL
HGB UR QL STRIP: NEGATIVE
KETONES UR STRIP-MCNC: NEGATIVE MG/DL
LEUKOCYTE ESTERASE UR QL STRIP: ABNORMAL
MUCOUS THREADS #/AREA URNS LPF: PRESENT /LPF
NITRATE UR QL: NEGATIVE
PH UR STRIP: 5 PH (ref 5–7)
RBC #/AREA URNS AUTO: 0 /HPF (ref 0–2)
SOURCE: ABNORMAL
SP GR UR STRIP: 1.02 (ref 1–1.03)
SQUAMOUS #/AREA URNS AUTO: 2 /HPF (ref 0–1)
UROBILINOGEN UR STRIP-MCNC: NORMAL MG/DL (ref 0–2)
WBC #/AREA URNS AUTO: 12 /HPF (ref 0–2)
YEAST #/AREA URNS HPF: ABNORMAL /HPF

## 2018-01-29 PROCEDURE — 81001 URINALYSIS AUTO W/SCOPE: CPT | Performed by: NURSE PRACTITIONER

## 2018-01-29 PROCEDURE — 87086 URINE CULTURE/COLONY COUNT: CPT | Performed by: NURSE PRACTITIONER

## 2018-01-30 DIAGNOSIS — I25.10 CORONARY ARTERY DISEASE INVOLVING NATIVE HEART WITHOUT ANGINA PECTORIS, UNSPECIFIED VESSEL OR LESION TYPE: ICD-10-CM

## 2018-01-30 DIAGNOSIS — D64.9 ANEMIA, UNSPECIFIED TYPE: ICD-10-CM

## 2018-01-30 DIAGNOSIS — R19.7 DIARRHEA, UNSPECIFIED TYPE: ICD-10-CM

## 2018-01-30 DIAGNOSIS — I10 BENIGN ESSENTIAL HYPERTENSION: Primary | ICD-10-CM

## 2018-01-30 LAB
BACTERIA SPEC CULT: NORMAL
Lab: NORMAL
SPECIMEN SOURCE: NORMAL

## 2018-01-31 RX ORDER — AMLODIPINE BESYLATE 5 MG/1
5 TABLET ORAL DAILY
Qty: 31 TABLET | Refills: 98 | Status: SHIPPED | OUTPATIENT
Start: 2018-01-31 | End: 2019-02-26

## 2018-01-31 RX ORDER — LOPERAMIDE HYDROCHLORIDE 2 MG/1
1 TABLET ORAL DAILY
Qty: 31 TABLET | Refills: 98 | Status: SHIPPED | OUTPATIENT
Start: 2018-01-31 | End: 2018-04-18

## 2018-01-31 RX ORDER — LOSARTAN POTASSIUM 50 MG/1
50 TABLET ORAL DAILY
Qty: 31 TABLET | Refills: 98 | Status: SHIPPED | OUTPATIENT
Start: 2018-01-31 | End: 2019-02-26

## 2018-01-31 RX ORDER — ATORVASTATIN CALCIUM 40 MG/1
40 TABLET, FILM COATED ORAL DAILY
Qty: 30 TABLET | Refills: 98 | Status: SHIPPED | OUTPATIENT
Start: 2018-01-31 | End: 2019-02-18

## 2018-02-01 ENCOUNTER — TRANSFERRED RECORDS (OUTPATIENT)
Dept: HEALTH INFORMATION MANAGEMENT | Facility: CLINIC | Age: 83
End: 2018-02-01

## 2018-02-02 ENCOUNTER — ASSISTED LIVING VISIT (OUTPATIENT)
Dept: GERIATRICS | Facility: CLINIC | Age: 83
End: 2018-02-02
Payer: MEDICARE

## 2018-02-02 VITALS — DIASTOLIC BLOOD PRESSURE: 86 MMHG | SYSTOLIC BLOOD PRESSURE: 134 MMHG | HEART RATE: 72 BPM

## 2018-02-02 DIAGNOSIS — G47.33 OSA (OBSTRUCTIVE SLEEP APNEA): ICD-10-CM

## 2018-02-02 DIAGNOSIS — R35.0 URINARY FREQUENCY: ICD-10-CM

## 2018-02-02 DIAGNOSIS — N39.41 URGENCY INCONTINENCE: Primary | ICD-10-CM

## 2018-02-02 NOTE — PROGRESS NOTES
[facility]    Chief Complaint   Patient presents with     RECHECK       HPI:    Salud Arroyo is a 84 year old  (2/20/1933), who is being seen today for an episodic care visit at [facility].  HPI information obtained from: facility staff, patient report and family/first contact Tj report.Today's concern is:  1. Urgency incontinence    2. Urinary frequency    3. HÉCTOR (obstructive sleep apnea)      NP received a call from Son, Tj, due to concerns of a UTI in Salud. Resident has been having frequent dribbling and incontinence in her brief which is unusual for resident. A UA was obtained on 1/29 and had positive leukocytes, negative nitrates. Culture resulted as a contaminated specimen. Another specimen was taken on 2/1 and resulted with a moderate amount of leukocytes, WBC-7 and negative nitrates. Culture is pending.     Resident was visited today while in her apartment. She was just waking from a nap. She states that she feels more tired lately even though she is sleeping more throughout the day. She sometimes has urgency but denies painful urination or frequency. She endorses an increase in incontinence which is new for her. Denies fever, chills or body aches. States her appetite has been good but slept through lunch today. Denies N/V/D or constipation. She has not had recent congestion, shortness of breath or chest pain. Denies unilateral weakness, decreased sensation, change in vision, or dizziness. She believes the years is 1917, states the correct month, and can tell me her correct name and birthdate.     Vitals today  /72, HR 68, RR 16, Temp 98.1, 94% on room air     Heart rate range: 57-74  Wt: 154lbs 9/17/17 -143lbs 12/12/17    ALLERGIES: Amoxicillin; Lisinopril; Ranitidine; and Zofran [ondansetron]  Past Medical, Surgical, Family and Social History reviewed and updated in [facility].    Current Outpatient Prescriptions   Medication Sig Dispense Refill      amLODIPine (NORVASC) 5 MG tablet Take 1 tablet (5 mg) by mouth daily 31 tablet 98     aspirin 81 MG EC tablet Take 1 tablet (81 mg) by mouth daily 30 tablet 98     atorvastatin (LIPITOR) 40 MG tablet Take 1 tablet (40 mg) by mouth daily 30 tablet 98     losartan (COZAAR) 50 MG tablet Take 1 tablet (50 mg) by mouth daily 31 tablet 98     Multiple Vitamins-Iron (MULTI-VITAMIN  /IRON) TABS Take 1 tablet by mouth daily 31 tablet 98     loperamide (SM ANTI-DIARRHEAL) 2 MG tablet Take 0.5 tablets (1 mg) by mouth daily 31 tablet 98     escitalopram (LEXAPRO) 5 MG tablet Take 1 tablet (5 mg) by mouth daily 31 tablet 98     omeprazole (PRILOSEC) 10 MG CR capsule Take 1 capsule (10 mg) by mouth every morning 31 capsule 98     METHYLPHENIDATE HCL PO Take 2.5 mg by mouth daily        Medications reviewed:  Medications reconciled to facility chart and changes were made to reflect current medications as identified as above med list. Below are the changes that were made:   Medications stopped since last EPIC medication reconciliation:   Medications Discontinued During This Encounter   Medication Reason     loperamide (IMODIUM) 2 MG capsule Medication Reconciliation Clean Up       Medications started since last [facility] medication reconciliation:  No orders of the defined types were placed in this encounter.    REVIEW OF SYSTEMS:  4 point ROS including Respiratory, CV, GI and , other than that noted in the HPI,  is negative    Physical Exam:  /86  Pulse 72  GENERAL APPEARANCE:  Alert, in no distress, pleasant, cooperative, oriented to self and month. Dressed nicely, well kept.    EYES:  EOM, lids, pupils and irises normal, sclera clear and conjunctiva normal, no discharge or mattering on lids or lashes noted  ENT:  Lips are chapped, mouth dry. nose without drainage or crusting, external ears without lesions, hearing acuity intact  NECK:  Nontender, supple, symmetrical  RESP:  respiratory effort and palpation of chest normal,  no chest wall tenderness, no respiratory distress, Lung sounds clear, patient is on room air  CV:  Palpation and auscultation of heart done, rate and rhythm regular, no murmur, no rub or gallop. Edema none in bilateral lower extremities.   ABDOMEN:  normal bowel sounds, soft, nontender.  M/S:   Gait and station ambulating from room to living room without assistance. Gait steady. No tenderness or swelling of the joints; able to move all extremities  SKIN:  Inspection and palpation of skin and subcutaneous tissue: skin warm, dry and intact without rashes  NEURO: cranial nerves 2-12 grossly intact, no facial asymmetry, no speech deficits and able to follow directions, moves all extremities symmetrically  PSYCH:  insight and judgement, memory impaired, affect and mood-flat      Recent Labs:   CBC RESULTS:   Recent Labs   Lab Test  10/05/17   1443   WBC  6.8   RBC  5.23*   HGB  15.3   HCT  43.5   MCV  83   MCH  29.3   MCHC  35.2   RDW  14.0   PLT  286       Last Basic Metabolic Panel:  Recent Labs   Lab Test  10/05/17   1443   NA  136   POTASSIUM  3.8   CHLORIDE  100   PELON  10.2*   CO2  23   BUN  10   CR  0.73   GLC  147*       Liver Function Studies -   Recent Labs   Lab Test  10/05/17   1443   PROTTOTAL  8.4   ALBUMIN  4.1   BILITOTAL  1.0   ALKPHOS  100   AST  20   ALT  45       Assessment/Plan:  (N39.41) Urgency incontinence  (primary encounter diagnosis)  (R35.0) Urinary frequency  Comment: UA with possible UTI. +leukocytes and WBC. Culture is pending. Due to increased incontinence which is likely secondary to urgency and frequency, increased sleeping and feelings of tired, she will be treated with a 3 day course of antibiotics. Her lips and mouth were dry and she slept through lunch today, wondering if she is a little dry? Her vitals did not indicate dehydration. Spoke with son today and encouraged him and his siblings to be sure Salud is taking an increase of fluids in over this weekend. He has agreed to BMP to  be sure there is not an acute kidney injury.   Plan: Cipro 250 mg q12h x3 days. Staff to offer a full glass (240 ml) of water with each med pass and to be sure she is attending all meals so fluids are not missed.     (G47.33) HÉCTOR (obstructive sleep apnea)  Comment: history of sleep apnea and did not tolerate a CPAP. I wonder if the untreated HÉCTOR is contributing to the daytime sleepiness.     Orders:  Ciprofloxacin 250 mg q12h x 3 days.  BMP on 2/3/18.    Staff to encourage a full glass of water at each medication pass.  Primary NP to follow up with resident next week.      Electronically signed by  ZAYNAB Tovar CNP

## 2018-02-02 NOTE — Clinical Note
Hey! I dont have access to her full weights and blood pressures but the ranges that were put in for blood pressure went up to 170's systolic and she looks to have had an eleven pound weight loss since September. Without having access to the facility records, I didn't address those issues in this encounter. Can you please look at weights and BP when you see her on Wednesday?  Thanks,  Daisy

## 2018-02-04 ENCOUNTER — TRANSFERRED RECORDS (OUTPATIENT)
Dept: HEALTH INFORMATION MANAGEMENT | Facility: CLINIC | Age: 83
End: 2018-02-04

## 2018-02-04 LAB
ANION GAP SERPL CALCULATED.3IONS-SCNC: 7 MMOL/L (ref 3–14)
BUN SERPL-MCNC: 13 MG/DL (ref 7–30)
CALCIUM SERPL-MCNC: 9.3 MG/DL (ref 8.5–10.1)
CHLORIDE SERPLBLD-SCNC: 106 MMOL/L (ref 94–109)
CO2 SERPL-SCNC: 27 MMOL/L (ref 20–32)
CREAT SERPL-MCNC: 0.82 MG/DL (ref 0.52–1.04)
GFR SERPL CREATININE-BSD FRML MDRD: 66 ML/MIN/1.73M2
GLUCOSE SERPL-MCNC: 108 MG/DL (ref 70–99)
POTASSIUM SERPL-SCNC: 4.4 MMOL/L (ref 3.4–5.3)
SODIUM SERPL-SCNC: 140 MMOL/L (ref 133–144)

## 2018-02-07 NOTE — PROGRESS NOTES
[facility] [facility]  Chief Complaint   Patient presents with     Annual Comprehensive Exam Assisted Living       HPI:    Salud Arroyo is a 84 year old  (2/20/1933), who is being seen today for an annual comprehensive visit at [place] [facility].  HPI information obtained from: facility chart records, [facility] chart review and family/first contact son- Tj report.  Today's concerns are:    Hypersomnia  Adjustment disorder with depressed mood  Continues to spend much time in bed. Staff assist with mid morning cares and escort to lunch. She frequently will lay in bed after lunch. Family reports she is not sleeping or engaging in reading or watching TV. Today she is in bed this afternoon. She is pleasant and is appropriate with superficial conversation. She does talk about attending early evening activities.She is unwilling to complete PHQ-9 assessment today.     Essential hypertension  BP average 147/82 HR 73. She denies chest pain or shortness of breath. Weight is down at 143 lbs.     Irritable bowel syndrome with diarrhea  Urinary incontinence in female  Family reports more episodes of stool and urine incontinence. Recently treated with short course of antibiotics for possible UTI.       HÉCTOR (obstructive sleep apnea)  Dx of condition but unwilling or unable to use CPAP. This could be contributor to excessive daytime tiredness.    Cognitive decline  Family is considering a move to memory care for increased assistance with ADLs and additional oversight.    BP goals are <150/90 mm Hg.This is higher than ACC and AHA recommendations due to risk of dizziness and falls. Patient is stable with current plan of care and routine assessment.    Depression screen done: PHQ-9 Given screen score and clinical assessment patient is needing further interventions of medication adjustment and will plan to reassess in 3 months.      ALLERGIES: Amoxicillin; Lisinopril; Ranitidine; and Zofran  [ondansetron]  PROBLEM LIST:  Patient Active Problem List   Diagnosis     History of CVA (cerebrovascular accident)     Nausea and vomiting     Generalized muscle weakness     Falls frequently     Hyperglycemia     History of basal cell carcinoma     Osteopenia of multiple sites     Anxiety     Actinic keratosis     Intestinal disaccharidase deficiencies and disaccharide malabsorption     Osteoarthritis of multiple joints     Prolapse of vaginal wall     Erythematous condition     Hyperlipidemia     Irritable bowel syndrome     Essential hypertension     IFG (impaired fasting glucose)     Granuloma annulare     Hypersomnia     Hypokalemia     Adjustment disorder with depressed mood     HÉCTOR (obstructive sleep apnea)     Cognitive decline     PAST MEDICAL HISTORY:  has a past medical history of Cancer (H); Cataract; Granuloma annulare; HTN (hypertension); Hyperlipidemia; IFG (impaired fasting glucose); Irritable bowel syndrome; Osteopenia; and Uveitis.  PAST SURGICAL HISTORY:  has a past surgical history that includes Hysterectomy; tonsillectomy; adenoidectomy; tubal ligation; nasal/sinus polypectomy; breast biopsy, rt/lt; CATARACT REMOVAL; REMOVAL OF OVARY(S); BLPHPLSTY UP EYELD; W/EXCESS SKIN; and Eye surgery.  FAMILY HISTORY: family history includes Breast Cancer in her sister and another family member; CANCER in her sister; HEART DISEASE in her mother; Macular Degeneration in her maternal aunt.  SOCIAL HISTORY:    IMMUNIZATIONS:  Most Recent Immunizations   Administered Date(s) Administered     Influenza (High Dose) 3 valent vaccine 09/11/2017     Pneumo Conj 13-V (2010&after) 02/09/2015     Pneumococcal 23 valent 11/10/1998     TD (ADULT, 7+) 07/02/2003     Zoster vaccine, live 03/27/2007     Above immunizations pulled from [employer]. [facility] and facility records also reconciled. Outstanding information sent to  to update [facility] Future immunizations are not needed at this point as all  recommended immunizations are up to date.   MEDICATIONS:  Current Outpatient Prescriptions   Medication Sig Dispense Refill     amLODIPine (NORVASC) 5 MG tablet Take 1 tablet (5 mg) by mouth daily 31 tablet 98     aspirin 81 MG EC tablet Take 1 tablet (81 mg) by mouth daily 30 tablet 98     atorvastatin (LIPITOR) 40 MG tablet Take 1 tablet (40 mg) by mouth daily 30 tablet 98     losartan (COZAAR) 50 MG tablet Take 1 tablet (50 mg) by mouth daily 31 tablet 98     Multiple Vitamins-Iron (MULTI-VITAMIN  /IRON) TABS Take 1 tablet by mouth daily 31 tablet 98     loperamide (SM ANTI-DIARRHEAL) 2 MG tablet Take 0.5 tablets (1 mg) by mouth daily 31 tablet 98     escitalopram (LEXAPRO) 5 MG tablet Take 1 tablet (5 mg) by mouth daily 31 tablet 98     omeprazole (PRILOSEC) 10 MG CR capsule Take 1 capsule (10 mg) by mouth every morning 31 capsule 98     METHYLPHENIDATE HCL PO Take 2.5 mg by mouth daily        Medications reviewed:  Medications reconciled to facility chart and changes were made to reflect current medications as identified as above med list. Below are the changes that were made:   Medications stopped since last EPIC medication reconciliation:   There are no discontinued medications.    Medications started since last [facility] medication reconciliation:  No orders of the defined types were placed in this encounter.     Case Management:  I have reviewed the Assisted Living care plan, current immunizations and preventive care/cancer screening..Future cancer screening is not clinically indicated secondary to age/goals of care Patient's desire to return to the community is present, but is not able due to care needs . Current Level of Care is appropriate.    Advance Directive Discussion:    I reviewed the current advanced directives as reflected in EPIC, the POLST and the facility chart, and verified the congruency of orders 2/10/18. I contacted the first party Tj and discussed plan of Care.  I did not due to cognitive  impairment review the advance directives with the resident.     Team Discussion:  I communicated with the appropriate disciplines involved with the Plan of Care:   Nursing      Patient Goal:  Patient's goal is pain control and comfort.    Information reviewed:  Medications, vital signs, orders, and nursing notes.    ROS:  Unobtainable secondary to cognitive impairment or aphasia, but today pt reports fine    Exam:  /76  Pulse 74  Temp 97.8  F (36.6  C)  Resp 16  SpO2 97%  GENERAL APPEARANCE:  Alert, in no distress, cooperative and smiling  ENT:  Mouth and posterior oropharynx normal, dry mucous membranes  EYES:  EOM, conjunctivae, lids, pupils and irises normal  NECK:  No adenopathy,masses or thyromegaly  RESP:  respiratory effort and palpation of chest normal, lungs clear to auscultation   CV:  Palpation and auscultation of heart done , regular rate and rhythm, no murmur, rub, or gallop, no edema  ABDOMEN:  normal bowel sounds, soft, nontender  M/S:   Gait and Station at baseline  SKIN:  Inspection of skin and subcutaneous tissue baseline  NEURO:   Cranial nerves 2-12 are normal tested and grossly at patient's baseline  PSYCH:  oriented X 3 but forgetful.        Lab/Diagnostic data:   CBC RESULTS:   Recent Labs   Lab Test  10/05/17   1443   WBC  6.8   RBC  5.23*   HGB  15.3   HCT  43.5   MCV  83   MCH  29.3   MCHC  35.2   RDW  14.0   PLT  286       Last Basic Metabolic Panel:  Recent Labs   Lab Test 02/04/18  10/05/17   1443   NA  140  136   POTASSIUM  4.4  3.8   CHLORIDE  106  100   PELON  9.3  10.2*   CO2  27  23   BUN  13  10   CR  0.82  0.73   GLC  108*  147*       Liver Function Studies -   Recent Labs   Lab Test  10/05/17   1443   PROTTOTAL  8.4   ALBUMIN  4.1   BILITOTAL  1.0   ALKPHOS  100   AST  20   ALT  45     PHQ-9 score:    PHQ-9 SCORE 2/10/2018   Total Score 0       ASSESSMENT/PLAN  (G47.10) Hypersomnia  (primary encounter diagnosis)  (F43.21) Adjustment disorder with depressed mood  Comment:  Ongoing will medication adjustment needed  Plan:   -Methylphenidate to 5 mg po daily in the morning  -Escitalopram to 10 mg po daily in the morning  -Monitor for effective changes    (I10) Essential hypertension  Comment: Chronic and controlled  Plan:   -Norvasc 5 mg po daily  -ASA 81 mg po daily  -Cozaar 50 mg po daily    (K58.0) Irritable bowel syndrome with diarrhea  (R32) Urinary incontinence in female  Comment: Ongoing issue  Plan:   -Loperamide to 2 mg po daily at HS    (G47.33) HÉCTOR (obstructive sleep apnea)  Comment: Hx of condition  Plan:   -Continue with elevation of HOB to aid in breathing    (R41.89) Cognitive decline  Comment: Ongoing  Plan:   -Supportive and elevated services by facility  -Family is agreeable to looking at all options including a move to memory care      Electronically signed by:  ZAYNAB Hale CNP

## 2018-02-08 ENCOUNTER — ASSISTED LIVING VISIT (OUTPATIENT)
Dept: GERIATRICS | Facility: CLINIC | Age: 83
End: 2018-02-08
Payer: MEDICARE

## 2018-02-08 DIAGNOSIS — G47.10 HYPERSOMNIA: Primary | ICD-10-CM

## 2018-02-08 DIAGNOSIS — I10 ESSENTIAL HYPERTENSION: ICD-10-CM

## 2018-02-08 DIAGNOSIS — R19.7 DIARRHEA, UNSPECIFIED TYPE: ICD-10-CM

## 2018-02-08 DIAGNOSIS — R32 URINARY INCONTINENCE IN FEMALE: ICD-10-CM

## 2018-02-08 DIAGNOSIS — G47.33 OSA (OBSTRUCTIVE SLEEP APNEA): ICD-10-CM

## 2018-02-08 DIAGNOSIS — R41.89 COGNITIVE DECLINE: ICD-10-CM

## 2018-02-08 DIAGNOSIS — K58.0 IRRITABLE BOWEL SYNDROME WITH DIARRHEA: ICD-10-CM

## 2018-02-08 DIAGNOSIS — F43.21 ADJUSTMENT DISORDER WITH DEPRESSED MOOD: ICD-10-CM

## 2018-02-08 NOTE — LETTER
2/8/2018        RE: Salud Arroyo  [facility]  [address]  [address]  [place] [address]        [facility]  Chief Complaint   Patient presents with     Annual Comprehensive Exam Assisted Living       HPI:    Salud Arroyo is a 84 year old  (2/20/1933), who is being seen today for an annual comprehensive visit at [facility].  HPI information obtained from: facility chart records, [facility] chart review and family/first contact son- Tj report.  Today's concerns are:    Hypersomnia  Adjustment disorder with depressed mood  Continues to spend much time in bed. Staff assist with mid morning cares and escort to lunch. She frequently will lay in bed after lunch. Family reports she is not sleeping or engaging in reading or watching TV. Today she is in bed this afternoon. She is pleasant and is appropriate with superficial conversation. She does talk about attending early evening activities.She is unwilling to complete PHQ-9 assessment today.     Essential hypertension  BP average 147/82 HR 73. She denies chest pain or shortness of breath. Weight is down at 143 lbs.     Irritable bowel syndrome with diarrhea  Urinary incontinence in female  Family reports more episodes of stool and urine incontinence. Recently treated with short course of antibiotics for possible UTI.       HÉCTOR (obstructive sleep apnea)  Dx of condition but unwilling or unable to use CPAP. This could be contributor to excessive daytime tiredness.    Cognitive decline  Family is considering a move to memory care for increased assistance with ADLs and additional oversight.    BP goals are <150/90 mm Hg.This is higher than ACC and AHA recommendations due to risk of dizziness and falls. Patient is stable with current plan of care and routine assessment.    Depression screen done: PHQ-9 Given screen score and clinical assessment patient is needing further interventions of medication adjustment  and will plan to reassess in 3 months.      ALLERGIES: Amoxicillin; Lisinopril; Ranitidine; and Zofran [ondansetron]  PROBLEM LIST:  Patient Active Problem List   Diagnosis     History of CVA (cerebrovascular accident)     Nausea and vomiting     Generalized muscle weakness     Falls frequently     Hyperglycemia     History of basal cell carcinoma     Osteopenia of multiple sites     Anxiety     Actinic keratosis     Intestinal disaccharidase deficiencies and disaccharide malabsorption     Osteoarthritis of multiple joints     Prolapse of vaginal wall     Erythematous condition     Hyperlipidemia     Irritable bowel syndrome     Essential hypertension     IFG (impaired fasting glucose)     Granuloma annulare     Hypersomnia     Hypokalemia     Adjustment disorder with depressed mood     HÉCTOR (obstructive sleep apnea)     Cognitive decline     PAST MEDICAL HISTORY:  has a past medical history of Cancer (H); Cataract; Granuloma annulare; HTN (hypertension); Hyperlipidemia; IFG (impaired fasting glucose); Irritable bowel syndrome; Osteopenia; and Uveitis.  PAST SURGICAL HISTORY:  has a past surgical history that includes Hysterectomy; tonsillectomy; adenoidectomy; tubal ligation; nasal/sinus polypectomy; breast biopsy, rt/lt; CATARACT REMOVAL; REMOVAL OF OVARY(S); BLPHPLSTY UP EYELD; W/EXCESS SKIN; and Eye surgery.  FAMILY HISTORY: family history includes Breast Cancer in her sister and another family member; CANCER in her sister; HEART DISEASE in her mother; Macular Degeneration in her maternal aunt.  SOCIAL HISTORY:    IMMUNIZATIONS:  Most Recent Immunizations   Administered Date(s) Administered     Influenza (High Dose) 3 valent vaccine 09/11/2017     Pneumo Conj 13-V (2010&after) 02/09/2015     Pneumococcal 23 valent 11/10/1998     TD (ADULT, 7+) 07/02/2003     Zoster vaccine, live 03/27/2007     Above immunizations pulled from [facility]. [facility] and facility records also reconciled. Outstanding information sent to   to update [facility] Future immunizations are not needed at this point as all recommended immunizations are up to date.   MEDICATIONS:  Current Outpatient Prescriptions   Medication Sig Dispense Refill     amLODIPine (NORVASC) 5 MG tablet Take 1 tablet (5 mg) by mouth daily 31 tablet 98     aspirin 81 MG EC tablet Take 1 tablet (81 mg) by mouth daily 30 tablet 98     atorvastatin (LIPITOR) 40 MG tablet Take 1 tablet (40 mg) by mouth daily 30 tablet 98     losartan (COZAAR) 50 MG tablet Take 1 tablet (50 mg) by mouth daily 31 tablet 98     Multiple Vitamins-Iron (MULTI-VITAMIN  /IRON) TABS Take 1 tablet by mouth daily 31 tablet 98     loperamide (SM ANTI-DIARRHEAL) 2 MG tablet Take 0.5 tablets (1 mg) by mouth daily 31 tablet 98     escitalopram (LEXAPRO) 5 MG tablet Take 1 tablet (5 mg) by mouth daily 31 tablet 98     omeprazole (PRILOSEC) 10 MG CR capsule Take 1 capsule (10 mg) by mouth every morning 31 capsule 98     METHYLPHENIDATE HCL PO Take 2.5 mg by mouth daily        Medications reviewed:  Medications reconciled to facility chart and changes were made to reflect current medications as identified as above med list. Below are the changes that were made:   Medications stopped since last EPIC medication reconciliation:   There are no discontinued medications.    Medications started since last [facility] medication reconciliation:  No orders of the defined types were placed in this encounter.     Case Management:  I have reviewed the Assisted Living care plan, current immunizations and preventive care/cancer screening..Future cancer screening is not clinically indicated secondary to age/goals of care Patient's desire to return to the community is present, but is not able due to care needs . Current Level of Care is appropriate.    Advance Directive Discussion:    I reviewed the current advanced directives as reflected in EPIC, the POLST and the facility chart, and verified the congruency of orders  2/10/18. I contacted the first party Tj and discussed plan of Care.  I did not due to cognitive impairment review the advance directives with the resident.     Team Discussion:  I communicated with the appropriate disciplines involved with the Plan of Care:   Nursing      Patient Goal:  Patient's goal is pain control and comfort.    Information reviewed:  Medications, vital signs, orders, and nursing notes.    ROS:  Unobtainable secondary to cognitive impairment or aphasia, but today pt reports fine    Exam:  /76  Pulse 74  Temp 97.8  F (36.6  C)  Resp 16  SpO2 97%  GENERAL APPEARANCE:  Alert, in no distress, cooperative and smiling  ENT:  Mouth and posterior oropharynx normal, dry mucous membranes  EYES:  EOM, conjunctivae, lids, pupils and irises normal  NECK:  No adenopathy,masses or thyromegaly  RESP:  respiratory effort and palpation of chest normal, lungs clear to auscultation   CV:  Palpation and auscultation of heart done , regular rate and rhythm, no murmur, rub, or gallop, no edema  ABDOMEN:  normal bowel sounds, soft, nontender  M/S:   Gait and Station at baseline  SKIN:  Inspection of skin and subcutaneous tissue baseline  NEURO:   Cranial nerves 2-12 are normal tested and grossly at patient's baseline  PSYCH:  oriented X 3 but forgetful.        Lab/Diagnostic data:   CBC RESULTS:   Recent Labs   Lab Test  10/05/17   1443   WBC  6.8   RBC  5.23*   HGB  15.3   HCT  43.5   MCV  83   MCH  29.3   MCHC  35.2   RDW  14.0   PLT  286       Last Basic Metabolic Panel:  Recent Labs   Lab Test 02/04/18  10/05/17   1443   NA  140  136   POTASSIUM  4.4  3.8   CHLORIDE  106  100   PELON  9.3  10.2*   CO2  27  23   BUN  13  10   CR  0.82  0.73   GLC  108*  147*       Liver Function Studies -   Recent Labs   Lab Test  10/05/17   1443   PROTTOTAL  8.4   ALBUMIN  4.1   BILITOTAL  1.0   ALKPHOS  100   AST  20   ALT  45     PHQ-9 score:    PHQ-9 SCORE 2/10/2018   Total Score 0       ASSESSMENT/PLAN  (G47.10)  Hypersomnia  (primary encounter diagnosis)  (F43.21) Adjustment disorder with depressed mood  Comment: Ongoing will medication adjustment needed  Plan:   -Methylphenidate to 5 mg po daily in the morning  -Escitalopram to 10 mg po daily in the morning  -Monitor for effective changes    (I10) Essential hypertension  Comment: Chronic and controlled  Plan:   -Norvasc 5 mg po daily  -ASA 81 mg po daily  -Cozaar 50 mg po daily    (K58.0) Irritable bowel syndrome with diarrhea  (R32) Urinary incontinence in female  Comment: Ongoing issue  Plan:   -Loperamide to 2 mg po daily at HS    (G47.33) HÉCTOR (obstructive sleep apnea)  Comment: Hx of condition  Plan:   -Continue with elevation of HOB to aid in breathing    (R41.89) Cognitive decline  Comment: Ongoing  Plan:   -Supportive and elevated services by facility  -Family is agreeable to looking at all options including a move to memory care      Electronically signed by:  ZAYNAB Hale CNP          Sincerely,        ZAYNAB Hale CNP

## 2018-02-10 VITALS
SYSTOLIC BLOOD PRESSURE: 128 MMHG | HEART RATE: 74 BPM | RESPIRATION RATE: 16 BRPM | OXYGEN SATURATION: 97 % | DIASTOLIC BLOOD PRESSURE: 76 MMHG | TEMPERATURE: 97.8 F

## 2018-02-11 ASSESSMENT — PATIENT HEALTH QUESTIONNAIRE - PHQ9: SUM OF ALL RESPONSES TO PHQ QUESTIONS 1-9: 0

## 2018-04-18 ENCOUNTER — ASSISTED LIVING VISIT (OUTPATIENT)
Dept: GERIATRICS | Facility: CLINIC | Age: 83
End: 2018-04-18
Payer: MEDICARE

## 2018-04-18 VITALS
HEART RATE: 82 BPM | RESPIRATION RATE: 16 BRPM | TEMPERATURE: 97.7 F | SYSTOLIC BLOOD PRESSURE: 145 MMHG | WEIGHT: 143 LBS | BODY MASS INDEX: 25.33 KG/M2 | OXYGEN SATURATION: 92 % | DIASTOLIC BLOOD PRESSURE: 82 MMHG

## 2018-04-18 DIAGNOSIS — M15.9 OSTEOARTHRITIS OF MULTIPLE JOINTS, UNSPECIFIED OSTEOARTHRITIS TYPE: Primary | ICD-10-CM

## 2018-04-18 DIAGNOSIS — E78.5 HYPERLIPIDEMIA, UNSPECIFIED HYPERLIPIDEMIA TYPE: ICD-10-CM

## 2018-04-18 DIAGNOSIS — K58.0 IRRITABLE BOWEL SYNDROME WITH DIARRHEA: ICD-10-CM

## 2018-04-18 DIAGNOSIS — F43.21 ADJUSTMENT DISORDER WITH DEPRESSED MOOD: ICD-10-CM

## 2018-04-18 DIAGNOSIS — I10 ESSENTIAL HYPERTENSION: ICD-10-CM

## 2018-04-18 DIAGNOSIS — G47.10 HYPERSOMNIA: ICD-10-CM

## 2018-04-18 RX ORDER — LOPERAMIDE HCL 2 MG
2 CAPSULE ORAL DAILY
COMMUNITY
End: 2020-01-01

## 2018-04-18 NOTE — LETTER
4/18/2018        RE: Salud Arroyo  [facility]  [address]  [address]  [place] [address]        [facility]    Chief Complaint   Patient presents with     RECHECK       HPI:    Salud Arroyo is a 85 year old  (2/20/1933), who is being seen today for an episodic care visit at [facility]-memory care.  HPI information obtained from: facility chart records and [facility] chart review.Today's concern is:    Osteoarthritis of multiple joints, unspecified osteoarthritis type  No complaints of pain today. Ambulating around the unit with walker.     Hyperlipidemia, unspecified hyperlipidemia type  Essential hypertension  Continues on statin. BP average is 131/4 HR 67. No chest pain or shortness of breath. No lower extremity edema.     Adjustment disorder with depressed mood  Hypersomnia  Pleasant and bright at today's visit. Still in spending much time in bed. Staff assist with morning cares and escort to meals.      Irritable bowel syndrome with diarrhea  No concerns from resident or staff today. She is incontinent of bowel and bladder. Continues on Imodium daily.       ALLERGIES: Amoxicillin; Lisinopril; Ranitidine; and Zofran [ondansetron]  Past Medical, Surgical, Family and Social History reviewed and updated in [facility].    Current Outpatient Prescriptions   Medication Sig Dispense Refill     amLODIPine (NORVASC) 5 MG tablet Take 1 tablet (5 mg) by mouth daily 31 tablet 98     aspirin 81 MG EC tablet Take 1 tablet (81 mg) by mouth daily 30 tablet 98     atorvastatin (LIPITOR) 40 MG tablet Take 1 tablet (40 mg) by mouth daily 30 tablet 98     ESCITALOPRAM OXALATE PO Take 10 mg by mouth daily       loperamide (IMODIUM) 2 MG capsule Take 2 mg by mouth daily       losartan (COZAAR) 50 MG tablet Take 1 tablet (50 mg) by mouth daily 31 tablet 98     METHYLPHENIDATE HCL PO Take 5 mg by mouth daily        Multiple Vitamins-Iron (MULTI-VITAMIN  /IRON) TABS Take 1  tablet by mouth daily 31 tablet 98     omeprazole (PRILOSEC) 10 MG CR capsule Take 1 capsule (10 mg) by mouth every morning 31 capsule 98     Patient Active Problem List   Diagnosis     History of CVA (cerebrovascular accident)     Nausea and vomiting     Generalized muscle weakness     Falls frequently     Hyperglycemia     History of basal cell carcinoma     Osteopenia of multiple sites     Anxiety     Actinic keratosis     Intestinal disaccharidase deficiencies and disaccharide malabsorption     Osteoarthritis of multiple joints     Prolapse of vaginal wall     Erythematous condition     Hyperlipidemia     Irritable bowel syndrome     Essential hypertension     IFG (impaired fasting glucose)     Granuloma annulare     Hypersomnia     Hypokalemia     Adjustment disorder with depressed mood     HÉCTOR (obstructive sleep apnea)     Cognitive decline       Medications reviewed:  Medications reconciled to facility chart and changes were made to reflect current medications as identified as above med list. Below are the changes that were made:   Medications stopped since last EPIC medication reconciliation:   Medications Discontinued During This Encounter   Medication Reason     escitalopram (LEXAPRO) 5 MG tablet      loperamide (SM ANTI-DIARRHEAL) 2 MG tablet        Medications started since last [facility] medication reconciliation:  Orders Placed This Encounter   Medications     ESCITALOPRAM OXALATE PO     Sig: Take 10 mg by mouth daily     loperamide (IMODIUM) 2 MG capsule     Sig: Take 2 mg by mouth daily     REVIEW OF SYSTEMS:  Limited secondary to cognitive impairment but today pt reports ok    Physical Exam:  /82  Pulse 82  Temp 97.7  F (36.5  C)  Resp 16  Wt 143 lb (64.9 kg)  SpO2 92%  BMI 25.33 kg/m2  GENERAL APPEARANCE:  Alert, in no distress, cooperative and smiling  ENT:  Mouth and posterior oropharynx normal, dry mucous membranes  EYES:  EOM, conjunctivae, lids, pupils and irises normal  NECK:  No  adenopathy,masses or thyromegaly  RESP:  respiratory effort and palpation of chest normal, lungs clear to auscultation   CV:  Palpation and auscultation of heart done , regular rate and rhythm, no murmur, rub, or gallop, no edema  ABDOMEN:  normal bowel sounds, soft, nontender  M/S:   Gait and Station at baseline with walker  SKIN:  Inspection of skin and subcutaneous tissue baseline  NEURO:   Cranial nerves 2-12 are normal tested and grossly at patient's baseline  PSYCH:  oriented X 3 but forgetful.        Recent Labs:  CBC RESULTS:   Recent Labs   Lab Test  10/05/17   1443   WBC  6.8   RBC  5.23*   HGB  15.3   HCT  43.5   MCV  83   MCH  29.3   MCHC  35.2   RDW  14.0   PLT  286       Last Basic Metabolic Panel:  Recent Labs   Lab Test 02/04/18  10/05/17   1443   NA  140  136   POTASSIUM  4.4  3.8   CHLORIDE  106  100   PELON  9.3  10.2*   CO2  27  23   BUN  13  10   CR  0.82  0.73   GLC  108*  147*       Liver Function Studies -   Recent Labs   Lab Test  10/05/17   1443   PROTTOTAL  8.4   ALBUMIN  4.1   BILITOTAL  1.0   ALKPHOS  100   AST  20   ALT  45     TSH with Free T4 (if TSH Abnormal) (08/16/2017 6:31 PM)       TSH with Free T4 (if TSH Abnormal) (08/16/2017 6:31 PM)   Component Value Ref Range   Thyroid Stimulating Hormone 3.59        Assessment/Plan:  (M15.9) Osteoarthritis of multiple joints, unspecified osteoarthritis type  (primary encounter diagnosis)  Comment: Hx of condition  Plan:   -Consider Tylenol to start if s/s of pain    (E78.5) Hyperlipidemia, unspecified hyperlipidemia type  (I10) Essential hypertension  Comment: Chronic  Plan:   -Lipitor 40 mg po daily  -BP and VS by facility  -BMP periodically  -Norvasc 5 mg po daily  -ASA 81 mg po daily  -losartan 50 mg po daily    (F43.21) Adjustment disorder with depressed mood  (G47.10) Hypersomnia  Comment: Ongoing  Plan:   -Escitalopram 10 mg po daily  -Methylphenidate 5 mg po daily    (K58.0) Irritable bowel syndrome with diarrhea  Comment: Intermittent    Plan:   -Imodium 2 mg po daily  -Monitor for constipation       Electronically signed by  ZAYNAB Hale CNP                      Sincerely,        ZAYNAB Hale CNP

## 2018-04-18 NOTE — PROGRESS NOTES
[facility]    Chief Complaint   Patient presents with     RECHECK       HPI:    Salud Arroyo is a 85 year old  (2/20/1933), who is being seen today for an episodic care visit at [facility]-memory care.  HPI information obtained from: facility chart records and [facility] chart review.Today's concern is:    Osteoarthritis of multiple joints, unspecified osteoarthritis type  No complaints of pain today. Ambulating around the unit with walker.     Hyperlipidemia, unspecified hyperlipidemia type  Essential hypertension  Continues on statin. BP average is 131/4 HR 67. No chest pain or shortness of breath. No lower extremity edema.     Adjustment disorder with depressed mood  Hypersomnia  Pleasant and bright at today's visit. Still in spending much time in bed. Staff assist with morning cares and escort to meals.      Irritable bowel syndrome with diarrhea  No concerns from resident or staff today. She is incontinent of bowel and bladder. Continues on Imodium daily.       ALLERGIES: Amoxicillin; Lisinopril; Ranitidine; and Zofran [ondansetron]  Past Medical, Surgical, Family and Social History reviewed and updated in [facility].    Current Outpatient Prescriptions   Medication Sig Dispense Refill     amLODIPine (NORVASC) 5 MG tablet Take 1 tablet (5 mg) by mouth daily 31 tablet 98     aspirin 81 MG EC tablet Take 1 tablet (81 mg) by mouth daily 30 tablet 98     atorvastatin (LIPITOR) 40 MG tablet Take 1 tablet (40 mg) by mouth daily 30 tablet 98     ESCITALOPRAM OXALATE PO Take 10 mg by mouth daily       loperamide (IMODIUM) 2 MG capsule Take 2 mg by mouth daily       losartan (COZAAR) 50 MG tablet Take 1 tablet (50 mg) by mouth daily 31 tablet 98     METHYLPHENIDATE HCL PO Take 5 mg by mouth daily        Multiple Vitamins-Iron (MULTI-VITAMIN  /IRON) TABS Take 1 tablet by mouth daily 31 tablet 98     omeprazole (PRILOSEC) 10 MG CR capsule Take 1 capsule (10 mg) by mouth every  morning 31 capsule 98     Patient Active Problem List   Diagnosis     History of CVA (cerebrovascular accident)     Nausea and vomiting     Generalized muscle weakness     Falls frequently     Hyperglycemia     History of basal cell carcinoma     Osteopenia of multiple sites     Anxiety     Actinic keratosis     Intestinal disaccharidase deficiencies and disaccharide malabsorption     Osteoarthritis of multiple joints     Prolapse of vaginal wall     Erythematous condition     Hyperlipidemia     Irritable bowel syndrome     Essential hypertension     IFG (impaired fasting glucose)     Granuloma annulare     Hypersomnia     Hypokalemia     Adjustment disorder with depressed mood     HÉCTOR (obstructive sleep apnea)     Cognitive decline       Medications reviewed:  Medications reconciled to facility chart and changes were made to reflect current medications as identified as above med list. Below are the changes that were made:   Medications stopped since last EPIC medication reconciliation:   Medications Discontinued During This Encounter   Medication Reason     escitalopram (LEXAPRO) 5 MG tablet      loperamide (SM ANTI-DIARRHEAL) 2 MG tablet        Medications started since last [facility] medication reconciliation:  Orders Placed This Encounter   Medications     ESCITALOPRAM OXALATE PO     Sig: Take 10 mg by mouth daily     loperamide (IMODIUM) 2 MG capsule     Sig: Take 2 mg by mouth daily     REVIEW OF SYSTEMS:  Limited secondary to cognitive impairment but today pt reports ok    Physical Exam:  /82  Pulse 82  Temp 97.7  F (36.5  C)  Resp 16  Wt 143 lb (64.9 kg)  SpO2 92%  BMI 25.33 kg/m2  GENERAL APPEARANCE:  Alert, in no distress, cooperative and smiling  ENT:  Mouth and posterior oropharynx normal, dry mucous membranes  EYES:  EOM, conjunctivae, lids, pupils and irises normal  NECK:  No adenopathy,masses or thyromegaly  RESP:  respiratory effort and palpation of chest normal, lungs clear to auscultation    CV:  Palpation and auscultation of heart done , regular rate and rhythm, no murmur, rub, or gallop, no edema  ABDOMEN:  normal bowel sounds, soft, nontender  M/S:   Gait and Station at baseline with walker  SKIN:  Inspection of skin and subcutaneous tissue baseline  NEURO:   Cranial nerves 2-12 are normal tested and grossly at patient's baseline  PSYCH:  oriented X 3 but forgetful.        Recent Labs:  CBC RESULTS:   Recent Labs   Lab Test  10/05/17   1443   WBC  6.8   RBC  5.23*   HGB  15.3   HCT  43.5   MCV  83   MCH  29.3   MCHC  35.2   RDW  14.0   PLT  286       Last Basic Metabolic Panel:  Recent Labs   Lab Test 02/04/18  10/05/17   1443   NA  140  136   POTASSIUM  4.4  3.8   CHLORIDE  106  100   PELON  9.3  10.2*   CO2  27  23   BUN  13  10   CR  0.82  0.73   GLC  108*  147*       Liver Function Studies -   Recent Labs   Lab Test  10/05/17   1443   PROTTOTAL  8.4   ALBUMIN  4.1   BILITOTAL  1.0   ALKPHOS  100   AST  20   ALT  45     TSH with Free T4 (if TSH Abnormal) (08/16/2017 6:31 PM)       TSH with Free T4 (if TSH Abnormal) (08/16/2017 6:31 PM)   Component Value Ref Range   Thyroid Stimulating Hormone 3.59        Assessment/Plan:  (M15.9) Osteoarthritis of multiple joints, unspecified osteoarthritis type  (primary encounter diagnosis)  Comment: Hx of condition  Plan:   -Consider Tylenol to start if s/s of pain    (E78.5) Hyperlipidemia, unspecified hyperlipidemia type  (I10) Essential hypertension  Comment: Chronic  Plan:   -Lipitor 40 mg po daily  -BP and VS by facility  -BMP periodically  -Norvasc 5 mg po daily  -ASA 81 mg po daily  -losartan 50 mg po daily    (F43.21) Adjustment disorder with depressed mood  (G47.10) Hypersomnia  Comment: Ongoing  Plan:   -Escitalopram 10 mg po daily  -Methylphenidate 5 mg po daily    (K58.0) Irritable bowel syndrome with diarrhea  Comment: Intermittent   Plan:   -Imodium 2 mg po daily  -Monitor for constipation       Electronically signed by  ZAYNAB Hale  CNP

## 2018-06-27 ENCOUNTER — ASSISTED LIVING VISIT (OUTPATIENT)
Dept: GERIATRICS | Facility: CLINIC | Age: 83
End: 2018-06-27
Payer: MEDICARE

## 2018-06-27 VITALS
RESPIRATION RATE: 18 BRPM | HEART RATE: 67 BPM | SYSTOLIC BLOOD PRESSURE: 158 MMHG | OXYGEN SATURATION: 98 % | DIASTOLIC BLOOD PRESSURE: 75 MMHG

## 2018-06-27 DIAGNOSIS — G47.33 OSA (OBSTRUCTIVE SLEEP APNEA): ICD-10-CM

## 2018-06-27 DIAGNOSIS — F43.21 ADJUSTMENT DISORDER WITH DEPRESSED MOOD: ICD-10-CM

## 2018-06-27 DIAGNOSIS — I10 ESSENTIAL HYPERTENSION: Primary | ICD-10-CM

## 2018-06-27 DIAGNOSIS — F02.80 MIXED ALZHEIMER'S AND VASCULAR DEMENTIA (H): ICD-10-CM

## 2018-06-27 DIAGNOSIS — G30.9 MIXED ALZHEIMER'S AND VASCULAR DEMENTIA (H): ICD-10-CM

## 2018-06-27 DIAGNOSIS — F01.50 MIXED ALZHEIMER'S AND VASCULAR DEMENTIA (H): ICD-10-CM

## 2018-06-27 NOTE — LETTER
6/27/2018        RE: Salud Arroyo  [facility]  [address]  [address]  [place] [address]        Salud Arroyo is a 85 year old female seen June 27, 2018 at [facility] where she has resided for one year (admit to [place] 6/2017) seen to follow up hypersomnolence, dementia and HTN.   She has had progressive cognitive decline, with fairly prominent loss of initiation in which she will spend a good deal of her time lying in bed.    This has been only minimally improved with addition of Ritalin.  Family has now moved patient to [facility] for more support, and that seems to be a good fit for her.    Today she is seen in her apartment up ambulating with FWW and wants to go help her neighbor, some difficulty redirecting her.     States she feels well, denies pain or other symptoms.         Past Medical History:   Diagnosis Date     Cancer (H)      Cataract      Granuloma annulare      HTN (hypertension)      Hyperlipidemia      IFG (impaired fasting glucose)      Irritable bowel syndrome      Osteopenia      Uveitis         SH:   , lives at [facility]   She has 3 children        ROS: negative other than above, although limited history.   CPT 4.3   Wt Readings from Last 5 Encounters:   04/18/18 143 lb (64.9 kg)   11/20/17 143 lb (64.9 kg)   11/16/17 142 lb 6.4 oz (64.6 kg)   11/09/17 143 lb 12.8 oz (65.2 kg)   11/07/17 143 lb 12.8 oz (65.2 kg)        EXAM:  Pleasant, NAD  /75  Pulse 67  Resp 18  SpO2 98%   Neck supple without adenopathy  Lungs with decreased BS, no rales or wheeze  Heart RRR s1s2 distant  Abd soft, NT, no distention, +BS  Ext without edema  Neuro: non-focal, no tremor or stiffness  Psych: affect guarded.     Labs reviewed.      IMP/PLAN:  (G47.10) Hypersomnia   Comment: multifactorial and including HÉCTOR, medications, depression and loss of initiation from dementia   Plan: trial decreasing Ritalin to 2.5 mg/day, and follow for any significant  increase in symptoms.      (F43.21) Adjustment disorder with depressed mood  Comment: she seems more settled in Memory Care  Plan: continue escitalopram  In house Psychologist to follow.       (G47.33) HÉCTOR (obstructive sleep apnea)  Comment: doesn't routinely uses CPAP  Plan: encourage nightly use for as many hours as possible    (Z86.73) History of CVA (cerebrovascular accident)  Comment: by MRI results  Plan: ASA, statin, bp meds for secondary prevention.        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     Plan: [facility] unit support for meds, meals, activity    (R10.13) Dyspepsia  Comment: recurrent GI symptoms, possible IBS.   Incontinent of bowel and bladder.      Now quiet, may have been related to donepezil  Plan: continue omeprazole at 10 mg/day       (I10) Essential HTN  Comment:   BP Readings from Last 3 Encounters:   06/27/18 158/75   04/18/18 145/82   02/07/18 128/76      Plan: continue losartan, amlodipine    Sandra Bravo MD         Sincerely,        Sandra Bravo MD

## 2018-06-27 NOTE — PROGRESS NOTES
Salud Arroyo is a 85 year old female seen June 27, 2018 at [facility] where she has resided for one year (admit to [place] 6/2017) seen to follow up hypersomnolence, dementia and HTN.   She has had progressive cognitive decline, with fairly prominent loss of initiation in which she will spend a good deal of her time lying in bed.    This has been only minimally improved with addition of Ritalin.  Family has now moved patient to Memory Care for more support, and that seems to be a good fit for her.    Today she is seen in her apartment up ambulating with FWW and wants to go help her neighbor, some difficulty redirecting her.     States she feels well, denies pain or other symptoms.         Past Medical History:   Diagnosis Date     Cancer (H)      Cataract      Granuloma annulare      HTN (hypertension)      Hyperlipidemia      IFG (impaired fasting glucose)      Irritable bowel syndrome      Osteopenia      Uveitis         SH:   , lives at [facility]   She has 3 children        ROS: negative other than above, although limited history.   CPT 4.3   Wt Readings from Last 5 Encounters:   04/18/18 143 lb (64.9 kg)   11/20/17 143 lb (64.9 kg)   11/16/17 142 lb 6.4 oz (64.6 kg)   11/09/17 143 lb 12.8 oz (65.2 kg)   11/07/17 143 lb 12.8 oz (65.2 kg)        EXAM:  Pleasant, NAD  /75  Pulse 67  Resp 18  SpO2 98%   Neck supple without adenopathy  Lungs with decreased BS, no rales or wheeze  Heart RRR s1s2 distant  Abd soft, NT, no distention, +BS  Ext without edema  Neuro: non-focal, no tremor or stiffness  Psych: affect guarded.     Labs reviewed.      IMP/PLAN:  (G47.10) Hypersomnia   Comment: multifactorial and including HÉCTOR, medications, depression and loss of initiation from dementia   Plan: trial decreasing Ritalin to 2.5 mg/day, and follow for any significant increase in symptoms.      (F43.21) Adjustment disorder with depressed mood  Comment: she seems more settled in  Memory Care  Plan: continue escitalopram  In house Psychologist to follow.       (G47.33) HÉCTOR (obstructive sleep apnea)  Comment: doesn't routinely uses CPAP  Plan: encourage nightly use for as many hours as possible    (Z86.73) History of CVA (cerebrovascular accident)  Comment: by MRI results  Plan: ASA, statin, bp meds for secondary prevention.        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     Plan: [place] [facility] unit support for meds, meals, activity    (R10.13) Dyspepsia  Comment: recurrent GI symptoms, possible IBS.   Incontinent of bowel and bladder.      Now quiet, may have been related to donepezil  Plan: continue omeprazole at 10 mg/day       (I10) Essential HTN  Comment:   BP Readings from Last 3 Encounters:   06/27/18 158/75   04/18/18 145/82   02/07/18 128/76      Plan: continue losartan, amlodipine    Sandra Bravo MD

## 2018-08-15 ENCOUNTER — ASSISTED LIVING VISIT (OUTPATIENT)
Dept: GERIATRICS | Facility: CLINIC | Age: 83
End: 2018-08-15
Payer: MEDICARE

## 2018-08-15 VITALS
DIASTOLIC BLOOD PRESSURE: 65 MMHG | SYSTOLIC BLOOD PRESSURE: 120 MMHG | HEART RATE: 74 BPM | TEMPERATURE: 99.2 F | RESPIRATION RATE: 16 BRPM | OXYGEN SATURATION: 97 %

## 2018-08-15 DIAGNOSIS — K58.0 IRRITABLE BOWEL SYNDROME WITH DIARRHEA: ICD-10-CM

## 2018-08-15 DIAGNOSIS — R29.6 FALLS FREQUENTLY: Primary | ICD-10-CM

## 2018-08-15 DIAGNOSIS — G47.10 HYPERSOMNIA: ICD-10-CM

## 2018-08-15 DIAGNOSIS — I10 ESSENTIAL HYPERTENSION: ICD-10-CM

## 2018-08-15 NOTE — LETTER
8/15/2018        RE: Salud Arroyo  [place] [address]  [address]  [address]  [place] [address]        [facility]    Chief Complaint   Patient presents with     MACKENZIEECK       [facility] Medical Record Number:  [id number]    HPI:    Salud Arroyo is a 85 year old  (2/20/1933), who is being seen today for an episodic care visit at [facility] care.  HPI information obtained from: facility chart records and [facility] chart review.Today's concern is:    Falls frequently  Found on the floor last month and noted related to self transfer. Ambulates with 4WW. Dried blood drainage in left nare and wondering if related to unwitnessed fall.     Irritable bowel syndrome with diarrhea  She does toilet herself and staff reports no issues with loose stools.     Essential hypertension  BP average per facility records 129/68 HR 70. All BP medications taken in the evening. History of CVA continues on ASA, statin, losartan, amlodipine for secondary prevention. No LE edema. Weight stable.    BP Readings from Last 6 Encounters:   08/15/18 120/65   06/27/18 158/75   04/18/18 145/82   02/07/18 128/76   02/02/18 134/86   12/11/17 121/74     Hypersomnia  Pleasant at today's visit. Continues to spend much time in her room and needs encouragement to join activities. She will eat meals in community dining room but needs staff escort and reminders. Ritalin increased to 5 mg po daily with not much improvement. Slightly more confused from last visit. Did say she was leaving to visit her sister in [facility]- no trip planned per nursing.      ALLERGIES: Amoxicillin; Lisinopril; Ranitidine; and Zofran [ondansetron]  Past Medical, Surgical, Family and Social History reviewed and updated in [facility].    Current Outpatient Prescriptions   Medication Sig Dispense Refill     amLODIPine (NORVASC) 5 MG tablet Take 1 tablet (5 mg) by mouth daily 31 tablet 98     aspirin 81 MG EC tablet Take  1 tablet (81 mg) by mouth daily 30 tablet 98     atorvastatin (LIPITOR) 40 MG tablet Take 1 tablet (40 mg) by mouth daily 30 tablet 98     ESCITALOPRAM OXALATE PO Take 10 mg by mouth daily       loperamide (IMODIUM) 2 MG capsule Take 2 mg by mouth daily And 4mg for acute diarrhea. And 2mg after each loose stool. Max 8 capsules per day (16mg)       losartan (COZAAR) 50 MG tablet Take 1 tablet (50 mg) by mouth daily 31 tablet 98     METHYLPHENIDATE HCL PO Take 5 mg by mouth daily        Multiple Vitamins-Iron (MULTI-VITAMIN  /IRON) TABS Take 1 tablet by mouth daily 31 tablet 98     omeprazole (PRILOSEC) 10 MG CR capsule Take 1 capsule (10 mg) by mouth every morning 31 capsule 98     Patient Active Problem List   Diagnosis     History of CVA (cerebrovascular accident)     Nausea and vomiting     Generalized muscle weakness     Falls frequently     Hyperglycemia     History of basal cell carcinoma     Osteopenia of multiple sites     Anxiety     Actinic keratosis     Intestinal disaccharidase deficiencies and disaccharide malabsorption     Osteoarthritis of multiple joints     Prolapse of vaginal wall     Erythematous condition     Hyperlipidemia     Irritable bowel syndrome     Essential hypertension     IFG (impaired fasting glucose)     Granuloma annulare     Hypersomnia     Hypokalemia     Adjustment disorder with depressed mood     HÉCTOR (obstructive sleep apnea)     Cognitive decline       Medications reviewed:  Medications reconciled to facility chart and changes were made to reflect current medications as identified as above med list. Below are the changes that were made:   Medications stopped since last EPIC medication reconciliation:   There are no discontinued medications.    Medications started since last [facility] medication reconciliation:  No orders of the defined types were placed in this encounter.    REVIEW OF SYSTEMS:  Limited secondary to cognitive impairment but today pt reports fine    Physical Exam:  BP  120/65  Pulse 74  Temp 99.2  F (37.3  C)  Resp 16  SpO2 97%  GENERAL APPEARANCE:  Alert, in no distress, cooperative and smiling, napping  ENT:  Mouth and posterior oropharynx normal, dry mucous membranes-nares see hpi  EYES:  EOM, conjunctivae, lids, pupils and irises normal  NECK:  No adenopathy,masses or thyromegaly  RESP:  respiratory effort and palpation of chest normal, lungs clear to auscultation   CV:  Palpation and auscultation of heart done , regular rate and rhythm, no murmur, rub, or gallop, no edema  ABDOMEN:  normal bowel sounds, soft, nontender  M/S:   Gait and Station at baseline with walker  SKIN:  Inspection of skin and subcutaneous tissue baseline  NEURO:   Cranial nerves 2-12 are normal tested and grossly at patient's baseline  PSYCH:  oriented X 2 but forgetful.     Recent Labs:  CBC RESULTS:   Recent Labs   Lab Test  10/05/17   1443   WBC  6.8   RBC  5.23*   HGB  15.3   HCT  43.5   MCV  83   MCH  29.3   MCHC  35.2   RDW  14.0   PLT  286       Last Basic Metabolic Panel:  Recent Labs   Lab Test 02/04/18  10/05/17   1443   NA  140  136   POTASSIUM  4.4  3.8   CHLORIDE  106  100   PELON  9.3  10.2*   CO2  27  23   BUN  13  10   CR  0.82  0.73   GLC  108*  147*       Liver Function Studies -   Recent Labs   Lab Test  10/05/17   1443   PROTTOTAL  8.4   ALBUMIN  4.1   BILITOTAL  1.0   ALKPHOS  100   AST  20   ALT  45       Assessment/Plan:  (R29.6) Falls frequently  (primary encounter diagnosis)  Comment: Hx of and self transfers  Plan:   -Increased staff surveillance since she spends much time in room    (K58.0) Irritable bowel syndrome with diarrhea  Comment: History of but currently no flares  Plan:   -Imodium 2 mg po daily  -Monitor for constipation      (I10) Essential hypertension  Comment: Chronic  Plan:   -Lipitor 40 mg po daily  -BP and VS by facility  -BMP periodically  -Norvasc 5 mg po daily  -ASA 81 mg po daily  -losartan 50 mg po daily    (G47.10) Hypersomnia  Comment: Ongoing  Plan:    -Escitalopram 10 mg po daily  -Methylphenidate 5 mg po daily trial taper to 2.5 mg po daily            Electronically signed by  ZAYNAB Hale CNP                      Sincerely,        ZAYNAB Hale CNP

## 2018-08-15 NOTE — PROGRESS NOTES
[facility]    Chief Complaint   Patient presents with     KURTIS       [facility] Medical Record Number:  [id number]    HPI:    Salud Arroyo is a 85 year old  (2/20/1933), who is being seen today for an episodic care visit at [facility]memory care.  HPI information obtained from: facility chart records and [facility] chart review.Today's concern is:    Falls frequently  Found on the floor last month and noted related to self transfer. Ambulates with 4WW. Dried blood drainage in left nare and wondering if related to unwitnessed fall.     Irritable bowel syndrome with diarrhea  She does toilet herself and staff reports no issues with loose stools.     Essential hypertension  BP average per facility records 129/68 HR 70. All BP medications taken in the evening. History of CVA continues on ASA, statin, losartan, amlodipine for secondary prevention. No LE edema. Weight stable.    BP Readings from Last 6 Encounters:   08/15/18 120/65   06/27/18 158/75   04/18/18 145/82   02/07/18 128/76   02/02/18 134/86   12/11/17 121/74     Hypersomnia  Pleasant at today's visit. Continues to spend much time in her room and needs encouragement to join activities. She will eat meals in community dining room but needs staff escort and reminders. Ritalin increased to 5 mg po daily with not much improvement. Slightly more confused from last visit. Did say she was leaving to visit her sister in [facility]- no trip planned per nursing.      ALLERGIES: Amoxicillin; Lisinopril; Ranitidine; and Zofran [ondansetron]  Past Medical, Surgical, Family and Social History reviewed and updated in [facility].    Current Outpatient Prescriptions   Medication Sig Dispense Refill     amLODIPine (NORVASC) 5 MG tablet Take 1 tablet (5 mg) by mouth daily 31 tablet 98     aspirin 81 MG EC tablet Take 1 tablet (81 mg) by mouth daily 30 tablet 98     atorvastatin (LIPITOR) 40 MG tablet Take 1 tablet (40 mg) by mouth  daily 30 tablet 98     ESCITALOPRAM OXALATE PO Take 10 mg by mouth daily       loperamide (IMODIUM) 2 MG capsule Take 2 mg by mouth daily And 4mg for acute diarrhea. And 2mg after each loose stool. Max 8 capsules per day (16mg)       losartan (COZAAR) 50 MG tablet Take 1 tablet (50 mg) by mouth daily 31 tablet 98     METHYLPHENIDATE HCL PO Take 5 mg by mouth daily        Multiple Vitamins-Iron (MULTI-VITAMIN  /IRON) TABS Take 1 tablet by mouth daily 31 tablet 98     omeprazole (PRILOSEC) 10 MG CR capsule Take 1 capsule (10 mg) by mouth every morning 31 capsule 98     Patient Active Problem List   Diagnosis     History of CVA (cerebrovascular accident)     Nausea and vomiting     Generalized muscle weakness     Falls frequently     Hyperglycemia     History of basal cell carcinoma     Osteopenia of multiple sites     Anxiety     Actinic keratosis     Intestinal disaccharidase deficiencies and disaccharide malabsorption     Osteoarthritis of multiple joints     Prolapse of vaginal wall     Erythematous condition     Hyperlipidemia     Irritable bowel syndrome     Essential hypertension     IFG (impaired fasting glucose)     Granuloma annulare     Hypersomnia     Hypokalemia     Adjustment disorder with depressed mood     HÉCTOR (obstructive sleep apnea)     Cognitive decline       Medications reviewed:  Medications reconciled to facility chart and changes were made to reflect current medications as identified as above med list. Below are the changes that were made:   Medications stopped since last EPIC medication reconciliation:   There are no discontinued medications.    Medications started since last [facility] medication reconciliation:  No orders of the defined types were placed in this encounter.    REVIEW OF SYSTEMS:  Limited secondary to cognitive impairment but today pt reports fine    Physical Exam:  /65  Pulse 74  Temp 99.2  F (37.3  C)  Resp 16  SpO2 97%  GENERAL APPEARANCE:  Alert, in no distress,  cooperative and smiling, napping  ENT:  Mouth and posterior oropharynx normal, dry mucous membranes-nares see hpi  EYES:  EOM, conjunctivae, lids, pupils and irises normal  NECK:  No adenopathy,masses or thyromegaly  RESP:  respiratory effort and palpation of chest normal, lungs clear to auscultation   CV:  Palpation and auscultation of heart done , regular rate and rhythm, no murmur, rub, or gallop, no edema  ABDOMEN:  normal bowel sounds, soft, nontender  M/S:   Gait and Station at baseline with walker  SKIN:  Inspection of skin and subcutaneous tissue baseline  NEURO:   Cranial nerves 2-12 are normal tested and grossly at patient's baseline  PSYCH:  oriented X 2 but forgetful.     Recent Labs:  CBC RESULTS:   Recent Labs   Lab Test  10/05/17   1443   WBC  6.8   RBC  5.23*   HGB  15.3   HCT  43.5   MCV  83   MCH  29.3   MCHC  35.2   RDW  14.0   PLT  286       Last Basic Metabolic Panel:  Recent Labs   Lab Test 02/04/18  10/05/17   1443   NA  140  136   POTASSIUM  4.4  3.8   CHLORIDE  106  100   PELON  9.3  10.2*   CO2  27  23   BUN  13  10   CR  0.82  0.73   GLC  108*  147*       Liver Function Studies -   Recent Labs   Lab Test  10/05/17   1443   PROTTOTAL  8.4   ALBUMIN  4.1   BILITOTAL  1.0   ALKPHOS  100   AST  20   ALT  45       Assessment/Plan:  (R29.6) Falls frequently  (primary encounter diagnosis)  Comment: Hx of and self transfers  Plan:   -Increased staff surveillance since she spends much time in room    (K58.0) Irritable bowel syndrome with diarrhea  Comment: History of but currently no flares  Plan:   -Imodium 2 mg po daily  -Monitor for constipation      (I10) Essential hypertension  Comment: Chronic  Plan:   -Lipitor 40 mg po daily  -BP and VS by facility  -BMP periodically  -Norvasc 5 mg po daily  -ASA 81 mg po daily  -losartan 50 mg po daily    (G47.10) Hypersomnia  Comment: Ongoing  Plan:   -Escitalopram 10 mg po daily  -Methylphenidate 5 mg po daily trial taper to 2.5 mg po  daily            Electronically signed by  ZAYNAB Hale CNP

## 2018-10-24 ENCOUNTER — ASSISTED LIVING VISIT (OUTPATIENT)
Dept: GERIATRICS | Facility: CLINIC | Age: 83
End: 2018-10-24
Payer: MEDICARE

## 2018-10-24 DIAGNOSIS — G47.10 HYPERSOMNIA: Primary | ICD-10-CM

## 2018-10-24 DIAGNOSIS — F43.21 ADJUSTMENT DISORDER WITH DEPRESSED MOOD: ICD-10-CM

## 2018-10-24 DIAGNOSIS — K58.0 IRRITABLE BOWEL SYNDROME WITH DIARRHEA: ICD-10-CM

## 2018-10-24 DIAGNOSIS — G47.33 OSA (OBSTRUCTIVE SLEEP APNEA): ICD-10-CM

## 2018-10-24 DIAGNOSIS — Z86.73 HISTORY OF CVA (CEREBROVASCULAR ACCIDENT): ICD-10-CM

## 2018-10-24 DIAGNOSIS — R29.6 FALLS FREQUENTLY: ICD-10-CM

## 2018-10-24 NOTE — PROGRESS NOTES
[facility]    Chief Complaint   Patient presents with     KURTIS       [facility] Medical Record Number:  [id number]  Place of Service where encounter took place:  [facility] [[id number]]    HPI:    Salud Arroyo is a 85 year old  (2/20/1933), who is being seen today for an episodic care visit.  HPI information obtained from: facility chart records and [facility] chart review.Today's concern is:    Hypersomnia  Continues to spend much time in room sleeping between meals/activities and needs encouragement from staff to attend daily events on unit. Methylphenidate was reduced at last visit related to lack of benefit with increased dose.    Adjustment disorder with depressed mood  Pleasant and calm. Reports to being in good spirits. Flat affect.    HÉCTOR (obstructive sleep apnea)  Has not tolerated CPAP and is not using    History of CVA (cerebrovascular accident)  BP average is 135/70 HR 66. Denies chest pain. Continues on ASA, statin.    BP: 120-155/70-79  P: 61-87  Wt: 155lbs 7/25 - 165lbs 8/24    Falls frequently  There are 2 reported incidents of slipping from bed this month. Ambulates with 4WW. Denies pain in knees or hips. History of OA.    Irritable bowel syndrome with diarrhea  Had many loose stools and diarrhea incontinent episodes while living in [place]. Moved to memory care 3/2018. No recent concerns. Continues on daily dose of loperamide.       ALLERGIES: Amoxicillin; Lisinopril; Ranitidine; and Zofran [ondansetron]  Past Medical, Surgical, Family and Social History reviewed and updated in [facility].    Current Outpatient Prescriptions   Medication Sig Dispense Refill     amLODIPine (NORVASC) 5 MG tablet Take 1 tablet (5 mg) by mouth daily 31 tablet 98     aspirin 81 MG EC tablet Take 1 tablet (81 mg) by mouth daily 30 tablet 98     atorvastatin (LIPITOR) 40 MG tablet Take 1 tablet (40 mg) by mouth daily 30 tablet 98     ESCITALOPRAM OXALATE PO Take 10 mg by  mouth daily       loperamide (IMODIUM) 2 MG capsule Take 2 mg by mouth daily And 4mg for acute diarrhea. And 2mg after each loose stool. Max 8 capsules per day (16mg)       losartan (COZAAR) 50 MG tablet Take 1 tablet (50 mg) by mouth daily 31 tablet 98     METHYLPHENIDATE HCL PO Take 2.5 mg by mouth daily        Multiple Vitamins-Iron (MULTI-VITAMIN  /IRON) TABS Take 1 tablet by mouth daily 31 tablet 98     omeprazole (PRILOSEC) 10 MG CR capsule Take 1 capsule (10 mg) by mouth every morning 31 capsule 98     Patient Active Problem List   Diagnosis     History of CVA (cerebrovascular accident)     Nausea and vomiting     Generalized muscle weakness     Falls frequently     Hyperglycemia     History of basal cell carcinoma     Osteopenia of multiple sites     Anxiety     Actinic keratosis     Intestinal disaccharidase deficiencies and disaccharide malabsorption     Osteoarthritis of multiple joints     Prolapse of vaginal wall     Erythematous condition     Hyperlipidemia     Irritable bowel syndrome     Essential hypertension     IFG (impaired fasting glucose)     Granuloma annulare     Hypersomnia     Hypokalemia     Adjustment disorder with depressed mood     HÉCTOR (obstructive sleep apnea)     Cognitive decline       Medications reviewed:  Medications reconciled to facility chart and changes were made to reflect current medications as identified as above med list. Below are the changes that were made:   Medications stopped since last EPIC medication reconciliation:   There are no discontinued medications.    Medications started since last [facility] medication reconciliation:  No orders of the defined types were placed in this encounter.    REVIEW OF SYSTEMS:  Limited secondary to cognitive impairment but today pt reports fine    Physical Exam:  /69  Pulse 70  Temp 98.4  F (36.9  C)  Resp 16  Wt 165 lb (74.8 kg)  SpO2 98%  BMI 29.23 kg/m2  GENERAL APPEARANCE:  Alert, in no distress, cooperative and smiling,  sleeping in bed  ENT:  Mouth and posterior oropharynx normal, dry mucous membranes  EYES:  EOM, conjunctivae, lids, pupils and irises normal  NECK:  No adenopathy,masses or thyromegaly  RESP:  respiratory effort and palpation of chest normal, lungs clear to auscultation   CV:  Palpation and auscultation of heart done , regular rate and rhythm, no murmur, rub, or gallop, no edema  ABDOMEN:  normal bowel sounds, soft, nontender  M/S:   Gait and Station at baseline with walker  SKIN:  Inspection of skin and subcutaneous tissue baseline  NEURO:   Cranial nerves 2-12 are normal tested and grossly at patient's baseline  PSYCH:  oriented X 2 but forgetful.     Recent Labs:  CBC RESULTS:   Recent Labs   Lab Test  10/05/17   1443   WBC  6.8   RBC  5.23*   HGB  15.3   HCT  43.5   MCV  83   MCH  29.3   MCHC  35.2   RDW  14.0   PLT  286       Last Basic Metabolic Panel:  Recent Labs   Lab Test 02/04/18  10/05/17   1443   NA  140  136   POTASSIUM  4.4  3.8   CHLORIDE  106  100   PELON  9.3  10.2*   CO2  27  23   BUN  13  10   CR  0.82  0.73   GLC  108*  147*       Liver Function Studies -   Recent Labs   Lab Test  10/05/17   1443   PROTTOTAL  8.4   ALBUMIN  4.1   BILITOTAL  1.0   ALKPHOS  100   AST  20   ALT  45     Assessment/Plan:  (G47.10) Hypersomnia  (primary encounter diagnosis)  Comment: ongoing   Plan:   -Ritalin 2.5 mg po every day    (F43.21) Adjustment disorder with depressed mood  Comment: Ongoing  Plan:   -Consider ACP if family agreeable-no recent notes in chart  -Escitalopram    (G47.33) HÉCTOR (obstructive sleep apnea)  Comment: Ongoing  Plan:   -Does not use or tolerate CPAP    (Z86.73) History of CVA (cerebrovascular accident)  Comment: Chronic   Plan:   --Lipitor 40 mg po daily  -BP and VS by facility (reduce to weekly BP)  -BMP periodically  -Norvasc 5 mg po daily  -ASA 81 mg po daily  -losartan 50 mg po daily    (R29.6) Falls frequently  Comment: Reported from bed   Plan:   -Consider bed rails? [facility] PT/OT to eval  and treat     (K58.0) Irritable bowel syndrome with diarrhea  Comment: No recent flares  Plan:   -Imodium 2 mg po daily  -Monitor for constipation       Electronically signed by  ZAYNAB Hale CNP

## 2018-10-24 NOTE — LETTER
10/24/2018        RE: Salud Martinez On Sweetie  [address]  [address]  [place] [address]        [facility]    Chief Complaint   Patient presents with     RECHECK       [facility] Record Number:  [id number]  Place of Service where encounter took place:  [facility] [[id number]]    HPI:    Salud Arroyo is a 85 year old  (2/20/1933), who is being seen today for an episodic care visit.  HPI information obtained from: facility chart records and [facility] chart review.Today's concern is:    Hypersomnia  Continues to spend much time in room sleeping between meals/activities and needs encouragement from staff to attend daily events on unit. Methylphenidate was reduced at last visit related to lack of benefit with increased dose.    Adjustment disorder with depressed mood  Pleasant and calm. Reports to being in good spirits. Flat affect.    HÉCTOR (obstructive sleep apnea)  Has not tolerated CPAP and is not using    History of CVA (cerebrovascular accident)  BP average is 135/70 HR 66. Denies chest pain. Continues on ASA, statin.    BP: 120-155/70-79  P: 61-87  Wt: 155lbs 7/25 - 165lbs 8/24    Falls frequently  There are 2 reported incidents of slipping from bed this month. Ambulates with 4WW. Denies pain in knees or hips. History of OA.    Irritable bowel syndrome with diarrhea  Had many loose stools and diarrhea incontinent episodes while living in [place]. Moved to [facility] care 3/2018. No recent concerns. Continues on daily dose of loperamide.       ALLERGIES: Amoxicillin; Lisinopril; Ranitidine; and Zofran [ondansetron]  Past Medical, Surgical, Family and Social History reviewed and updated in [facility].    Current Outpatient Prescriptions   Medication Sig Dispense Refill     amLODIPine (NORVASC) 5 MG tablet Take 1 tablet (5 mg) by mouth daily 31 tablet 98     aspirin 81 MG EC tablet Take 1 tablet (81 mg) by mouth daily 30 tablet 98     atorvastatin  (LIPITOR) 40 MG tablet Take 1 tablet (40 mg) by mouth daily 30 tablet 98     ESCITALOPRAM OXALATE PO Take 10 mg by mouth daily       loperamide (IMODIUM) 2 MG capsule Take 2 mg by mouth daily And 4mg for acute diarrhea. And 2mg after each loose stool. Max 8 capsules per day (16mg)       losartan (COZAAR) 50 MG tablet Take 1 tablet (50 mg) by mouth daily 31 tablet 98     METHYLPHENIDATE HCL PO Take 2.5 mg by mouth daily        Multiple Vitamins-Iron (MULTI-VITAMIN  /IRON) TABS Take 1 tablet by mouth daily 31 tablet 98     omeprazole (PRILOSEC) 10 MG CR capsule Take 1 capsule (10 mg) by mouth every morning 31 capsule 98     Patient Active Problem List   Diagnosis     History of CVA (cerebrovascular accident)     Nausea and vomiting     Generalized muscle weakness     Falls frequently     Hyperglycemia     History of basal cell carcinoma     Osteopenia of multiple sites     Anxiety     Actinic keratosis     Intestinal disaccharidase deficiencies and disaccharide malabsorption     Osteoarthritis of multiple joints     Prolapse of vaginal wall     Erythematous condition     Hyperlipidemia     Irritable bowel syndrome     Essential hypertension     IFG (impaired fasting glucose)     Granuloma annulare     Hypersomnia     Hypokalemia     Adjustment disorder with depressed mood     HÉCTOR (obstructive sleep apnea)     Cognitive decline       Medications reviewed:  Medications reconciled to facility chart and changes were made to reflect current medications as identified as above med list. Below are the changes that were made:   Medications stopped since last EPIC medication reconciliation:   There are no discontinued medications.    Medications started since last [facility] medication reconciliation:  No orders of the defined types were placed in this encounter.    REVIEW OF SYSTEMS:  Limited secondary to cognitive impairment but today pt reports fine    Physical Exam:  /69  Pulse 70  Temp 98.4  F (36.9  C)  Resp 16  Wt  165 lb (74.8 kg)  SpO2 98%  BMI 29.23 kg/m2  GENERAL APPEARANCE:  Alert, in no distress, cooperative and smiling, sleeping in bed  ENT:  Mouth and posterior oropharynx normal, dry mucous membranes  EYES:  EOM, conjunctivae, lids, pupils and irises normal  NECK:  No adenopathy,masses or thyromegaly  RESP:  respiratory effort and palpation of chest normal, lungs clear to auscultation   CV:  Palpation and auscultation of heart done , regular rate and rhythm, no murmur, rub, or gallop, no edema  ABDOMEN:  normal bowel sounds, soft, nontender  M/S:   Gait and Station at baseline with walker  SKIN:  Inspection of skin and subcutaneous tissue baseline  NEURO:   Cranial nerves 2-12 are normal tested and grossly at patient's baseline  PSYCH:  oriented X 2 but forgetful.     Recent Labs:  CBC RESULTS:   Recent Labs   Lab Test  10/05/17   1443   WBC  6.8   RBC  5.23*   HGB  15.3   HCT  43.5   MCV  83   MCH  29.3   MCHC  35.2   RDW  14.0   PLT  286       Last Basic Metabolic Panel:  Recent Labs   Lab Test 02/04/18  10/05/17   1443   NA  140  136   POTASSIUM  4.4  3.8   CHLORIDE  106  100   PELON  9.3  10.2*   CO2  27  23   BUN  13  10   CR  0.82  0.73   GLC  108*  147*       Liver Function Studies -   Recent Labs   Lab Test  10/05/17   1443   PROTTOTAL  8.4   ALBUMIN  4.1   BILITOTAL  1.0   ALKPHOS  100   AST  20   ALT  45     Assessment/Plan:  (G47.10) Hypersomnia  (primary encounter diagnosis)  Comment: ongoing   Plan:   -Ritalin 2.5 mg po every day    (F43.21) Adjustment disorder with depressed mood  Comment: Ongoing  Plan:   -Consider ACP if family agreeable-no recent notes in chart  -Escitalopram    (G47.33) HÉCTOR (obstructive sleep apnea)  Comment: Ongoing  Plan:   -Does not use or tolerate CPAP    (Z86.73) History of CVA (cerebrovascular accident)  Comment: Chronic   Plan:   --Lipitor 40 mg po daily  -BP and VS by facility (reduce to weekly BP)  -BMP periodically  -Norvasc 5 mg po daily  -ASA 81 mg po daily  -losartan 50  mg po daily    (R29.6) Falls frequently  Comment: Reported from bed   Plan:   -Consider bed rails? OT     (K58.0) Irritable bowel syndrome with diarrhea  Comment: No recent flares  Plan:   -Imodium 2 mg po daily  -Monitor for constipation       Electronically signed by  ZAYNAB Hale CNP                      Sincerely,        ZAYNAB Hale CNP

## 2018-10-25 VITALS
DIASTOLIC BLOOD PRESSURE: 69 MMHG | SYSTOLIC BLOOD PRESSURE: 130 MMHG | WEIGHT: 165 LBS | HEART RATE: 70 BPM | RESPIRATION RATE: 16 BRPM | BODY MASS INDEX: 29.23 KG/M2 | OXYGEN SATURATION: 98 % | TEMPERATURE: 98.4 F

## 2018-12-06 DIAGNOSIS — G47.10 HYPERSOMNIA: Primary | ICD-10-CM

## 2018-12-07 RX ORDER — METHYLPHENIDATE HYDROCHLORIDE 5 MG/1
TABLET ORAL
Qty: 30 TABLET | Refills: 0 | Status: SHIPPED | OUTPATIENT
Start: 2018-12-07 | End: 2019-01-23

## 2018-12-28 DIAGNOSIS — K21.9 GASTROESOPHAGEAL REFLUX DISEASE WITHOUT ESOPHAGITIS: ICD-10-CM

## 2018-12-28 RX ORDER — OMEPRAZOLE 10 MG/1
CAPSULE, DELAYED RELEASE ORAL
Qty: 31 CAPSULE | Refills: 98 | Status: SHIPPED | OUTPATIENT
Start: 2018-12-28 | End: 2020-01-01

## 2019-01-01 ENCOUNTER — TRANSFERRED RECORDS (OUTPATIENT)
Dept: HEALTH INFORMATION MANAGEMENT | Facility: CLINIC | Age: 84
End: 2019-01-01

## 2019-01-01 ENCOUNTER — ASSISTED LIVING VISIT (OUTPATIENT)
Dept: GERIATRICS | Facility: CLINIC | Age: 84
End: 2019-01-01
Payer: MEDICARE

## 2019-01-01 ENCOUNTER — DOCUMENTATION ONLY (OUTPATIENT)
Dept: OTHER | Facility: CLINIC | Age: 84
End: 2019-01-01

## 2019-01-01 VITALS
OXYGEN SATURATION: 97 % | SYSTOLIC BLOOD PRESSURE: 168 MMHG | DIASTOLIC BLOOD PRESSURE: 99 MMHG | RESPIRATION RATE: 16 BRPM | HEART RATE: 79 BPM | TEMPERATURE: 98.1 F

## 2019-01-01 VITALS
BODY MASS INDEX: 31.99 KG/M2 | SYSTOLIC BLOOD PRESSURE: 152 MMHG | TEMPERATURE: 97.8 F | WEIGHT: 180.6 LBS | RESPIRATION RATE: 14 BRPM | DIASTOLIC BLOOD PRESSURE: 78 MMHG | HEART RATE: 71 BPM

## 2019-01-01 VITALS
DIASTOLIC BLOOD PRESSURE: 70 MMHG | HEART RATE: 71 BPM | RESPIRATION RATE: 16 BRPM | TEMPERATURE: 97.8 F | OXYGEN SATURATION: 93 % | SYSTOLIC BLOOD PRESSURE: 130 MMHG

## 2019-01-01 VITALS
SYSTOLIC BLOOD PRESSURE: 150 MMHG | TEMPERATURE: 98.7 F | DIASTOLIC BLOOD PRESSURE: 80 MMHG | OXYGEN SATURATION: 97 % | RESPIRATION RATE: 16 BRPM | HEART RATE: 86 BPM

## 2019-01-01 DIAGNOSIS — R63.5 WEIGHT GAIN: Primary | ICD-10-CM

## 2019-01-01 DIAGNOSIS — K58.0 IRRITABLE BOWEL SYNDROME WITH DIARRHEA: ICD-10-CM

## 2019-01-01 DIAGNOSIS — G47.10 HYPERSOMNIA: ICD-10-CM

## 2019-01-01 DIAGNOSIS — F43.21 ADJUSTMENT DISORDER WITH DEPRESSED MOOD: ICD-10-CM

## 2019-01-01 DIAGNOSIS — K58.0 IRRITABLE BOWEL SYNDROME WITH DIARRHEA: Primary | ICD-10-CM

## 2019-01-01 DIAGNOSIS — I10 BENIGN ESSENTIAL HYPERTENSION: ICD-10-CM

## 2019-01-01 DIAGNOSIS — R63.5 WEIGHT GAIN: ICD-10-CM

## 2019-01-01 DIAGNOSIS — Z86.73 HISTORY OF CVA (CEREBROVASCULAR ACCIDENT): ICD-10-CM

## 2019-01-01 DIAGNOSIS — G30.9 MIXED ALZHEIMER'S AND VASCULAR DEMENTIA (H): ICD-10-CM

## 2019-01-01 DIAGNOSIS — F01.50 MIXED ALZHEIMER'S AND VASCULAR DEMENTIA (H): ICD-10-CM

## 2019-01-01 DIAGNOSIS — F02.80 MIXED ALZHEIMER'S AND VASCULAR DEMENTIA (H): ICD-10-CM

## 2019-01-01 DIAGNOSIS — R52 PAIN: ICD-10-CM

## 2019-01-01 DIAGNOSIS — R29.6 FALLS FREQUENTLY: Primary | ICD-10-CM

## 2019-01-01 DIAGNOSIS — R35.89 POLYURIA: ICD-10-CM

## 2019-01-01 DIAGNOSIS — E11.69 TYPE 2 DIABETES MELLITUS WITH OTHER SPECIFIED COMPLICATION, WITHOUT LONG-TERM CURRENT USE OF INSULIN (H): ICD-10-CM

## 2019-01-01 DIAGNOSIS — G47.10 HYPERSOMNIA: Primary | ICD-10-CM

## 2019-01-01 DIAGNOSIS — I10 BENIGN ESSENTIAL HYPERTENSION: Primary | ICD-10-CM

## 2019-01-01 LAB
ANION GAP SERPL CALCULATED.3IONS-SCNC: 9 MMOL/L (ref 3–14)
BUN SERPL-MCNC: 14 MG/DL (ref 7–30)
CALCIUM SERPL-MCNC: 9.8 MG/DL (ref 8.5–10)
CHLORIDE SERPLBLD-SCNC: 105 MMOL/L (ref 94–109)
CO2 SERPL-SCNC: 25 MMOL/L (ref 20–32)
CREAT SERPL-MCNC: 0.74 MG/DL (ref 0.52–1.04)
ERYTHROCYTE [DISTWIDTH] IN BLOOD BY AUTOMATED COUNT: 14.5 % (ref 10–15)
GFR SERPL CREATININE-BSD FRML MDRD: 73 ML/MIN/1.73M2
GLUCOSE SERPL-MCNC: 222 MG/DL (ref 70–99)
HBA1C MFR BLD: 6.6 % (ref 0–5.6)
HCT VFR BLD AUTO: 40.2 % (ref 35–47)
HEMOGLOBIN: 12.7 G/DL (ref 11.7–15.7)
MCH RBC QN AUTO: 27.8 PG (ref 26.5–33)
MCHC RBC AUTO-ENTMCNC: 31.6 G/DL (ref 31.5–36.5)
MCV RBC AUTO: 88 FL (ref 78–100)
PLATELET # BLD AUTO: 253 10E9/L (ref 150–450)
POTASSIUM SERPL-SCNC: 4.1 MMOL/L (ref 3.4–5.3)
RBC # BLD AUTO: 4.57 10E12/L (ref 3.8–5.2)
SODIUM SERPL-SCNC: 139 MMOL/L (ref 133–144)
WBC # BLD AUTO: 5.7 10E9/L (ref 4–11)

## 2019-01-01 RX ORDER — METHYLPHENIDATE HYDROCHLORIDE 5 MG/1
2.5 TABLET ORAL DAILY
Qty: 15 TABLET | Refills: 0 | Status: SHIPPED | OUTPATIENT
Start: 2019-01-01 | End: 2019-01-01

## 2019-01-01 RX ORDER — METHYLPHENIDATE HYDROCHLORIDE 5 MG/1
TABLET ORAL
Qty: 30 TABLET | Refills: 0 | Status: SHIPPED | OUTPATIENT
Start: 2019-01-01 | End: 2019-01-01

## 2019-01-01 RX ORDER — METHYLPHENIDATE HYDROCHLORIDE 5 MG/1
TABLET ORAL
Qty: 30 TABLET | Refills: 0 | Status: SHIPPED | OUTPATIENT
Start: 2019-01-01 | End: 2020-01-01

## 2019-01-01 RX ORDER — METHYLPHENIDATE HYDROCHLORIDE 5 MG/1
TABLET ORAL
Qty: 15 TABLET | Refills: 0 | Status: SHIPPED | OUTPATIENT
Start: 2019-01-01 | End: 2019-01-01

## 2019-01-01 RX ORDER — LOSARTAN POTASSIUM 50 MG/1
100 TABLET ORAL DAILY
Qty: 28 TABLET | Refills: 98 | Status: SHIPPED | OUTPATIENT
Start: 2019-01-01

## 2019-01-01 RX ORDER — SENNOSIDES 8.6 MG
650 CAPSULE ORAL EVERY 8 HOURS PRN
Qty: 30 TABLET | Refills: 11 | Status: SHIPPED | OUTPATIENT
Start: 2019-01-01

## 2019-01-23 ENCOUNTER — ASSISTED LIVING VISIT (OUTPATIENT)
Dept: GERIATRICS | Facility: CLINIC | Age: 84
End: 2019-01-23
Payer: MEDICARE

## 2019-01-23 VITALS
SYSTOLIC BLOOD PRESSURE: 132 MMHG | DIASTOLIC BLOOD PRESSURE: 62 MMHG | TEMPERATURE: 99.5 F | RESPIRATION RATE: 16 BRPM | WEIGHT: 165 LBS | OXYGEN SATURATION: 97 % | HEART RATE: 71 BPM | BODY MASS INDEX: 29.23 KG/M2

## 2019-01-23 DIAGNOSIS — F43.21 ADJUSTMENT DISORDER WITH DEPRESSED MOOD: ICD-10-CM

## 2019-01-23 DIAGNOSIS — I10 ESSENTIAL HYPERTENSION: ICD-10-CM

## 2019-01-23 DIAGNOSIS — Z86.73 HISTORY OF CVA (CEREBROVASCULAR ACCIDENT): Primary | ICD-10-CM

## 2019-01-23 DIAGNOSIS — G47.10 HYPERSOMNIA: ICD-10-CM

## 2019-01-23 DIAGNOSIS — G47.33 OSA (OBSTRUCTIVE SLEEP APNEA): ICD-10-CM

## 2019-01-23 DIAGNOSIS — K58.0 IRRITABLE BOWEL SYNDROME WITH DIARRHEA: ICD-10-CM

## 2019-01-23 NOTE — LETTER
1/23/2019        RE: Salud Martinez On [address]ie  [address]  [address]  [place] [address]        [facility]  Chief Complaint   Patient presents with     Annual Comprehensive Exam Assisted Living       [facility] Medical Record Number:  [id number]  Place of Service where encounter took place:  [facility] [[id number]]      HPI:    Salud Arroyo is a 85 year old  (2/20/1933), who is being seen today for an annual comprehensive visit.  HPI information obtained from: facility chart records, [facility] chart review and family/first contact son-Tj report.  Today's concerns are:    History of CVA (cerebrovascular accident)  Hypertension  Denies chest pain. Continues on ASA, statin. Trace edema in legs and feet. Weight up from admission around 10 lbs. Las ordered 8/5/18 but not done.    BP Readings from Last 3 Encounters:   01/23/19 132/62   10/24/18 130/69   08/15/18 120/65     Adjustment disorder with depressed mood  Hypersomnia  Pleasant and calm. Reports to being in good spirits. Flat affect. Staff reports she is attending some activities on the unit, more engaging at times. GDS score of 4 on 1/7/19. Continues on Escitalopram 10 mg po daily. Does spend time in room between meals/activities. On reduced dose of Methylphenidate starting 8/2018 no change with behaviors. Family states her sister passed and they have not updated yet.     Irritable bowel syndrome with diarrhea  Had many loose stools and diarrhea incontinent episodes while living [place]. Moved to memory care 3/2018. No recent concerns from staff. Continues on daily dose of loperamide. Staff reporting she is not aware of needing to void or have bowel movement. Family with concerns for how much incontinent products are used daily and that episodes continue.    HÉCTOR (obstructive sleep apnea)  Has not tolerated CPAP and is not using          BP goals are <150/90 mm Hg.This is higher than ACC  and AHA recommendations due to goals of care and risk for hypotension. Patient is stable with current plan of care and routine assessment.    ALLERGIES: Amoxicillin; Lisinopril; Ranitidine; and Zofran [ondansetron]  PROBLEM LIST:  Patient Active Problem List   Diagnosis     History of CVA (cerebrovascular accident)     Nausea and vomiting     Generalized muscle weakness     Falls frequently     Hyperglycemia     History of basal cell carcinoma     Osteopenia of multiple sites     Anxiety     Actinic keratosis     Intestinal disaccharidase deficiencies and disaccharide malabsorption     Osteoarthritis of multiple joints     Prolapse of vaginal wall     Erythematous condition     Hyperlipidemia     Irritable bowel syndrome     Essential hypertension     IFG (impaired fasting glucose)     Granuloma annulare     Hypersomnia     Hypokalemia     Adjustment disorder with depressed mood     HÉCTOR (obstructive sleep apnea)     Cognitive decline     PAST MEDICAL HISTORY:  has a past medical history of Cancer (H), Cataract, Granuloma annulare, HTN (hypertension), Hyperlipidemia, IFG (impaired fasting glucose), Irritable bowel syndrome, Osteopenia, and Uveitis.  PAST SURGICAL HISTORY:  has a past surgical history that includes Hysterectomy; tonsillectomy; adenoidectomy; tubal ligation; nasal/sinus polypectomy; breast biopsy, rt/lt; CATARACT REMOVAL; REMOVAL OF OVARY(S); BLPHPLSTY UP EYELD; W/EXCESS SKIN; and Eye surgery.  FAMILY HISTORY: family history includes Breast Cancer in her sister and another family member; Cancer in her sister; Heart Disease in her mother; Macular Degeneration in her maternal aunt.  SOCIAL HISTORY:    IMMUNIZATIONS:  Most Recent Immunizations   Administered Date(s) Administered     Influenza (High Dose) 3 valent vaccine 09/26/2018     Pneumo Conj 13-V (2010&after) 02/09/2015     Pneumococcal 23 valent 11/10/1998     TD (ADULT, 7+) 07/02/2003     TDAP Vaccine (Adacel) 04/21/2011     Zoster vaccine, live  03/27/2007     Above immunizations pulled from [facility]. [facility] and facility records also reconciled. Outstanding information sent to  to update [facility].  Future immunizations are not needed at this point as all recommended immunizations are up to date.   MEDICATIONS:  Current Outpatient Medications   Medication Sig Dispense Refill     amLODIPine (NORVASC) 5 MG tablet Take 1 tablet (5 mg) by mouth daily 31 tablet 98     aspirin 81 MG EC tablet Take 1 tablet (81 mg) by mouth daily 30 tablet 98     atorvastatin (LIPITOR) 40 MG tablet Take 1 tablet (40 mg) by mouth daily 30 tablet 98     ESCITALOPRAM OXALATE PO Take 10 mg by mouth daily       loperamide (IMODIUM) 2 MG capsule Take 2 mg by mouth daily And 4mg for acute diarrhea. And 2mg after each loose stool. Max 8 capsules per day (16mg)       losartan (COZAAR) 50 MG tablet Take 1 tablet (50 mg) by mouth daily 31 tablet 98     Multiple Vitamins-Iron (MULTI-VITAMIN  /IRON) TABS Take 1 tablet by mouth daily 31 tablet 98     omeprazole (PRILOSEC) 10 MG DR capsule TAKE 1 CAPSULE BY MOUTH ONCE DAILY 31 capsule 98     Patient Active Problem List   Diagnosis     History of CVA (cerebrovascular accident)     Nausea and vomiting     Generalized muscle weakness     Falls frequently     Hyperglycemia     History of basal cell carcinoma     Osteopenia of multiple sites     Anxiety     Actinic keratosis     Intestinal disaccharidase deficiencies and disaccharide malabsorption     Osteoarthritis of multiple joints     Prolapse of vaginal wall     Erythematous condition     Hyperlipidemia     Irritable bowel syndrome     Essential hypertension     IFG (impaired fasting glucose)     Granuloma annulare     Hypersomnia     Hypokalemia     Adjustment disorder with depressed mood     HÉCTOR (obstructive sleep apnea)     Cognitive decline       Medications reviewed:  Medications reconciled to facility chart and changes were made to reflect current medications as  identified as above med list. Below are the changes that were made:   Medications stopped since last EPIC medication reconciliation:   Medications Discontinued During This Encounter   Medication Reason     methylphenidate (RITALIN) 5 MG tablet Therapy completed       Medications started since last [facility] medication reconciliation:  No orders of the defined types were placed in this encounter.      Case Management:  I have reviewed the Assisted Living care plan, current immunizations and preventive care/cancer screening..Future cancer screening is not clinically indicated secondary to age/goals of care Patient's desire to return to the community is not assessible due to cognitive impairment. Current Level of Care is appropriate.    Advance Directive Discussion:    I reviewed the current advanced directives as reflected in EPIC, the POLST and the facility chart, and verified the congruency of orders 1/23/19. I contacted the first party Tj and left a message regarding the plan of Care.  I did not due to cognitive impairment review the advance directives with the resident.     Team Discussion:  I communicated with the appropriate disciplines involved with the Plan of Care:   Nursing      Patient Goal:  Patient's goal is pain control and comfort. Treat reversible conditions    Information reviewed:  Medications, vital signs, orders, and nursing notes.    ROS:  Limited secondary to cognitive impairment but today pt reports fine    Exam:  /62   Pulse 71   Temp 99.5  F (37.5  C)   Resp 16   Wt 74.8 kg (165 lb)   SpO2 97%   BMI 29.23 kg/m     GENERAL APPEARANCE:  Alert, in no distress, cooperative and smiling, sleeping in bed  ENT:  Mouth and posterior oropharynx normal, dry mucous membranes  EYES:  EOM, conjunctivae, lids, pupils and irises normal  NECK:  No adenopathy,masses or thyromegaly  RESP:  respiratory effort and palpation of chest normal, lungs clear to auscultation   CV:  Palpation and auscultation of  heart done , regular rate and rhythm, no murmur, rub, or gallop, no edema  ABDOMEN:  normal bowel sounds, soft, nontender  BREASTS: no palpated lumps  M/S:   Gait and Station at baseline with walker  SKIN:  Inspection of skin and subcutaneous tissue baseline  NEURO:   Cranial nerves 2-12 are normal tested and grossly at patient's baseline  PSYCH:  oriented X 2 but forgetful.     Lab/Diagnostic data:   CBC RESULTS:   Recent Labs   Lab Test 10/05/17  1443   WBC 6.8   RBC 5.23*   HGB 15.3   HCT 43.5   MCV 83   MCH 29.3   MCHC 35.2   RDW 14.0          Last Basic Metabolic Panel:  Recent Labs   Lab Test 02/04/18 10/05/17  1443    136   POTASSIUM 4.4 3.8   CHLORIDE 106 100   PELON 9.3 10.2*   CO2 27 23   BUN 13 10   CR 0.82 0.73   * 147*       Liver Function Studies -   Recent Labs   Lab Test 10/05/17  1443   PROTTOTAL 8.4   ALBUMIN 4.1   BILITOTAL 1.0   ALKPHOS 100   AST 20   ALT 45     ASSESSMENT/PLAN  (Z86.73) History of CVA (cerebrovascular accident)  (primary encounter diagnosis)  (I10) Essential hypertension  Comment: Stable  Plan:   -Amlodipine 5 mg po daily  -ASA 81 mg po daily for secondary prevention  -Atorvastatin 40 mg po daily  -Losartan 50 mg po daily  -VS and weights monthly  -CBC, CMP, Lipids 1/30/19    (F43.21) Adjustment disorder with depressed mood  (G47.10) Hypersomnia  Comment: Ongoing  Plan:  -Escitalopram 10 mg po daily  -Discontinuing methylphenidate   -Daily encouragement from staff to participate in activities  -Unclear if ACP would be helpful    (K58.0) Irritable bowel syndrome with diarrhea  Comment: Chronic and stable  Plan:   -omeprazole 10 mg po daily  -loperamide 2 mg po daily and prn for acute diarrhea    (G47.33) HÉCTOR (obstructive sleep apnea)  Comment: Ongoing  Plan:   -Does not use or tolerate CPAP      Electronically signed by:  ZAYNAB Hale CNP          Sincerely,        ZAYNAB Hale CNP

## 2019-01-25 DIAGNOSIS — F43.21 ADJUSTMENT DISORDER WITH DEPRESSED MOOD: ICD-10-CM

## 2019-01-25 RX ORDER — ESCITALOPRAM OXALATE 10 MG/1
TABLET ORAL
Qty: 31 TABLET | Refills: 98 | Status: SHIPPED | OUTPATIENT
Start: 2019-01-25 | End: 2020-01-01

## 2019-01-30 ENCOUNTER — TRANSFERRED RECORDS (OUTPATIENT)
Dept: HEALTH INFORMATION MANAGEMENT | Facility: CLINIC | Age: 84
End: 2019-01-30

## 2019-01-30 LAB
ALBUMIN SERPL-MCNC: 3.3 G/DL (ref 3.4–5)
ALP SERPL-CCNC: 64 U/L (ref 40–150)
ALT SERPL-CCNC: 63 U/L (ref 0–50)
ANION GAP SERPL CALCULATED.3IONS-SCNC: 7 MMOL/L (ref 3–14)
AST SERPL-CCNC: 40 U/L (ref 0–45)
BILIRUB SERPL-MCNC: 0.5 MG/DL (ref 0.2–1.3)
BUN SERPL-MCNC: 25 MG/DL (ref 7–30)
CALCIUM SERPL-MCNC: 8.9 MG/DL (ref 8.5–10.1)
CHLORIDE SERPLBLD-SCNC: 109 MMOL/L (ref 94–109)
CHOLEST SERPL-MCNC: 101 MG/DL
CO2 SERPL-SCNC: 25 MMOL/L (ref 20–32)
CREAT SERPL-MCNC: 0.86 MG/DL (ref 0.52–1.04)
ERYTHROCYTE [DISTWIDTH] IN BLOOD BY AUTOMATED COUNT: 14.3 % (ref 10–15)
GFR SERPL CREATININE-BSD FRML MDRD: 61 ML/MIN/1.73M2
GLUCOSE SERPL-MCNC: 134 MG/DL (ref 70–99)
HCT VFR BLD AUTO: 40.3 % (ref 35–47)
HDLC SERPL-MCNC: 33 MG/DL
HEMOGLOBIN: 13.2 G/DL (ref 11.7–15.7)
LDLC SERPL CALC-MCNC: 34 MG/DL
MCH RBC QN AUTO: 28.6 PG (ref 26.5–33)
MCHC RBC AUTO-ENTMCNC: 32.8 G/DL (ref 31.5–36.5)
MCV RBC AUTO: 87 FL (ref 78–100)
NONHDLC SERPL-MCNC: 68 MG/DL
PLATELET # BLD AUTO: 198 10^9/L (ref 150–450)
POTASSIUM SERPL-SCNC: 4.1 MMOL/L (ref 3.4–5.3)
PROT SERPL-MCNC: 7.1 G/DL (ref 6.8–8.8)
RBC # BLD AUTO: 4.62 10^12/L (ref 3.8–5.2)
SODIUM SERPL-SCNC: 141 MMOL/L (ref 133–144)
TRIGL SERPL-MCNC: 171 MG/DL
TSH SERPL-ACNC: 0.91 MU/L (ref 0.4–4)
WBC # BLD AUTO: 6.7 10^9/L (ref 4–11)

## 2019-02-18 DIAGNOSIS — I25.10 CORONARY ARTERY DISEASE INVOLVING NATIVE HEART WITHOUT ANGINA PECTORIS, UNSPECIFIED VESSEL OR LESION TYPE: ICD-10-CM

## 2019-02-18 RX ORDER — ATORVASTATIN CALCIUM 20 MG/1
20 TABLET, FILM COATED ORAL DAILY
COMMUNITY
End: 2019-03-14

## 2019-02-26 DIAGNOSIS — D64.9 ANEMIA, UNSPECIFIED TYPE: ICD-10-CM

## 2019-02-26 DIAGNOSIS — I25.10 CORONARY ARTERY DISEASE INVOLVING NATIVE HEART WITHOUT ANGINA PECTORIS, UNSPECIFIED VESSEL OR LESION TYPE: ICD-10-CM

## 2019-02-26 DIAGNOSIS — I10 BENIGN ESSENTIAL HYPERTENSION: ICD-10-CM

## 2019-02-27 RX ORDER — ASPIRIN 81 MG/1
TABLET ORAL
Qty: 28 TABLET | Refills: 98 | Status: SHIPPED | OUTPATIENT
Start: 2019-02-27 | End: 2020-01-01

## 2019-02-27 RX ORDER — MULTIVIT/IRON SULF/FOLIC ACID 15MG-0.4MG
TABLET ORAL
Qty: 28 TABLET | Refills: 98 | Status: SHIPPED | OUTPATIENT
Start: 2019-02-27 | End: 2019-01-01

## 2019-02-27 RX ORDER — AMLODIPINE BESYLATE 5 MG/1
TABLET ORAL
Qty: 28 TABLET | Refills: 98 | Status: SHIPPED | OUTPATIENT
Start: 2019-02-27 | End: 2020-01-01

## 2019-02-27 RX ORDER — ATORVASTATIN CALCIUM 20 MG/1
TABLET, FILM COATED ORAL
Qty: 31 TABLET | Refills: 98 | Status: SHIPPED | OUTPATIENT
Start: 2019-02-27 | End: 2020-01-01

## 2019-02-27 RX ORDER — LOSARTAN POTASSIUM 50 MG/1
TABLET ORAL
Qty: 28 TABLET | Refills: 98 | Status: SHIPPED | OUTPATIENT
Start: 2019-02-27 | End: 2019-01-01

## 2019-03-13 ENCOUNTER — ASSISTED LIVING VISIT (OUTPATIENT)
Dept: GERIATRICS | Facility: CLINIC | Age: 84
End: 2019-03-13
Payer: MEDICARE

## 2019-03-13 DIAGNOSIS — G47.10 HYPERSOMNIA: ICD-10-CM

## 2019-03-13 DIAGNOSIS — R19.7 DIARRHEA, UNSPECIFIED TYPE: ICD-10-CM

## 2019-03-13 DIAGNOSIS — M62.81 GENERALIZED MUSCLE WEAKNESS: ICD-10-CM

## 2019-03-13 DIAGNOSIS — K58.0 IRRITABLE BOWEL SYNDROME WITH DIARRHEA: Primary | ICD-10-CM

## 2019-03-13 NOTE — LETTER
"    3/13/2019        RE: Salud Martinez On [address]ie  [address]  [address]  [place] [address]        [facility]  [facility] Medical Record Number:  [id number]  Place of Service where encounter took place:  [facility] [[id number]]  Chief Complaint   Patient presents with     RECHECK       HPI:    Salud Arroyo  is a 86 year old (2/20/1933), who is being seen today for an episodic care visit.  HPI information obtained from: facility chart records, facility staff and family/first contact son-Tj report.     Call from son and continues to have concerns about his mother's loose foul smelling stool. Reports the \"smell makes him gag\". Also states they are dark in color and consistency of paste. Staff and family inconsistency with reporting s/s. C-diff orders placed but does not appear to have been done by facility. No recorded bowel movements (did ask for monitoring).Had extensive workup by GI PMH includes IBS both constipation and diarrhea, fecal urgency and incontinence. Had abdominal CT was unremarkable except for hepatic steatosis. C-diff and enteric stool studies were negative, had positive lab for H.Pylori in 2017 but further testing with EGD was determined negative and she did not tolerate treatment so it was stopped. Currently on multivitamin w/iron so FOBT could show false positive and hgb is WNL.    Seen today she is sleeping in bed after dinner. Spending most time in bed with noticeable increased by some of her children and staff. Recent discontinuation of methylphenidate could be contributor. Strong urine odor in room. Staff reporting urinates large amounts frequently. UA 1/29/18 with mixed urogenital dave. Reduced Atorvastatin to 20 mg daily with  ALT at 63 and possible contributor to GI SE/fatigue. She denies pain, abdominal pain, nausea, vomiting, dysuria. Cannot remember when last bowel movement was. Takes 2 mg po Imodium daily.     Past " Medical and Surgical History reviewed in Epic today.    MEDICATIONS:  Current Outpatient Medications   Medication Sig Dispense Refill     amLODIPine (NORVASC) 5 MG tablet TAKE 1 TABLET BY MOUTH ONCE DAILY 28 tablet 98     ASPIRIN ADULT LOW STRENGTH 81 MG EC tablet TAKE 1 TABLET BY MOUTH ONCE DAILY 28 tablet 98     atorvastatin (LIPITOR) 20 MG tablet TAKE 1 TABLET BY MOUTH ONCE DAILY 31 tablet 98     escitalopram (LEXAPRO) 10 MG tablet TAKE 1 TABLET BY MOUTH ONCE DAILY 31 tablet 98     loperamide (IMODIUM) 2 MG capsule Take 2 mg by mouth daily And 4mg for acute diarrhea. And 2mg after each loose stool. Max 8 capsules per day (16mg)       losartan (COZAAR) 50 MG tablet TAKE 1 TABLET BY MOUTH ONCE DAILY 28 tablet 98     Multiple Vitamins-Iron (TAB-A-GABY/IRON) TABS TAKE 1 TABLET BY MOUTH ONCE DAILY 28 tablet 98     omeprazole (PRILOSEC) 10 MG DR capsule TAKE 1 CAPSULE BY MOUTH ONCE DAILY 31 capsule 98       REVIEW OF SYSTEMS:  Limited secondary to cognitive impairment but today pt reports fine    Objective:  /73   Pulse 78   Temp 98.8  F (37.1  C)   Resp 16   SpO2 98%   Exam:  GENERAL APPEARANCE:  Alert, in no distress, cooperative and smiling, sleeping in bed  ENT:  Mouth and posterior oropharynx normal, dry mucous membranes  EYES:  EOM, conjunctivae, lids, pupils and irises normal  RESP:  respiratory effort and palpation of chest normal, lungs clear to auscultation   CV:  Palpation and auscultation of heart done , regular rate and rhythm, no murmur, rub, or gallop, trace edema  ABDOMEN:  normal bowel sounds, soft, nontender, distended   M/S:   Gait and Station at baseline with walker  SKIN:  Inspection of skin and subcutaneous tissue baseline  NEURO:   Cranial nerves 2-12 are normal tested and grossly at patient's baseline  PSYCH:  oriented X 2 but forgetful.     Labs:   Recent labs in [facility] reviewed by me today.     ASSESSMENT/PLAN:  (K58.0) Irritable bowel syndrome with diarrhea  (primary encounter  diagnosis)  (R19.7) Diarrhea, unspecified type  Comment: Worsening per family with ongoing concerns  Plan:   -Adding daily fiber  -Orders again for staff to record bowel movements   -Orders again for c-diff testing  -UA/UC  -Updated weight  -H. Pylori stool testing     (G47.10) Hypersomnia  Comment: Worse of medication  Plan:   -methylphenidate 2.5 mg po daily  -ACP referral          Total time with an established patient visit in the assisted livin minutes including discussions about the POC and care coordination with the patient and first contactTj. Greater than 50% of total time spent with counseling and coordinating care due to ongoing issue and family concerns       Electronically signed by:  ZAYNAB Hale CNP             Sincerely,        ZAYNAB Hale CNP

## 2019-03-13 NOTE — PROGRESS NOTES
"[facility]  [place] Medical Record Number:  [id number]  Place of Service where encounter took place:  [facility] [[id number]]  Chief Complaint   Patient presents with     RECHECK       HPI:    Salud Arroyo  is a 86 year old (2/20/1933), who is being seen today for an episodic care visit.  HPI information obtained from: facility chart records, facility staff and family/first contact son-Tj report.     Call from son and continues to have concerns about his mother's loose foul smelling stool. Reports the \"smell makes him gag\". Also states they are dark in color and consistency of paste. Staff and family inconsistency with reporting s/s. C-diff orders placed but does not appear to have been done by facility. No recorded bowel movements (did ask for monitoring).Had extensive workup by GI PMH includes IBS both constipation and diarrhea, fecal urgency and incontinence. Had abdominal CT was unremarkable except for hepatic steatosis. C-diff and enteric stool studies were negative, had positive lab for H.Pylori in 2017 but further testing with EGD was determined negative and she did not tolerate treatment so it was stopped. Currently on multivitamin w/iron so FOBT could show false positive and hgb is WNL.    Seen today she is sleeping in bed after dinner. Spending most time in bed with noticeable increased by some of her children and staff. Recent discontinuation of methylphenidate could be contributor. Strong urine odor in room so helped to the bathroom. Slow with sit to stand from toilet. Staff reporting urinates large amounts frequently. UA 1/29/18 with mixed urogenital dave. Reduced Atorvastatin to 20 mg daily with  ALT at 63 and possible contributor to GI SE/fatigue. She denies pain, abdominal pain, nausea, vomiting, dysuria. Cannot remember when last bowel movement was. Takes 2 mg po Imodium daily.     Past Medical and Surgical History reviewed in Epic " today.    MEDICATIONS:  Current Outpatient Medications   Medication Sig Dispense Refill     amLODIPine (NORVASC) 5 MG tablet TAKE 1 TABLET BY MOUTH ONCE DAILY 28 tablet 98     ASPIRIN ADULT LOW STRENGTH 81 MG EC tablet TAKE 1 TABLET BY MOUTH ONCE DAILY 28 tablet 98     atorvastatin (LIPITOR) 20 MG tablet TAKE 1 TABLET BY MOUTH ONCE DAILY 31 tablet 98     escitalopram (LEXAPRO) 10 MG tablet TAKE 1 TABLET BY MOUTH ONCE DAILY 31 tablet 98     loperamide (IMODIUM) 2 MG capsule Take 2 mg by mouth daily And 4mg for acute diarrhea. And 2mg after each loose stool. Max 8 capsules per day (16mg)       losartan (COZAAR) 50 MG tablet TAKE 1 TABLET BY MOUTH ONCE DAILY 28 tablet 98     Multiple Vitamins-Iron (TAB-A-GABY/IRON) TABS TAKE 1 TABLET BY MOUTH ONCE DAILY 28 tablet 98     omeprazole (PRILOSEC) 10 MG DR capsule TAKE 1 CAPSULE BY MOUTH ONCE DAILY 31 capsule 98       REVIEW OF SYSTEMS:  Limited secondary to cognitive impairment but today pt reports fine    Objective:  /73   Pulse 78   Temp 98.8  F (37.1  C)   Resp 16   SpO2 98%   Exam:  GENERAL APPEARANCE:  Alert, in no distress, cooperative and smiling, sleeping in bed  ENT:  Mouth and posterior oropharynx normal, dry mucous membranes  EYES:  EOM, conjunctivae, lids, pupils and irises normal  RESP:  respiratory effort and palpation of chest normal, lungs clear to auscultation   CV:  Palpation and auscultation of heart done , regular rate and rhythm, no murmur, rub, or gallop, trace edema  ABDOMEN:  normal bowel sounds, soft, nontender, distended   M/S:   Gait and Station at baseline with walker  SKIN:  Inspection of skin and subcutaneous tissue baseline  NEURO:   Cranial nerves 2-12 are normal tested and grossly at patient's baseline  PSYCH:  oriented X 2 but forgetful.     Labs:   Recent labs in [facility] reviewed by me today.     ASSESSMENT/PLAN:  (K58.0) Irritable bowel syndrome with diarrhea  (primary encounter diagnosis)  (R19.7) Diarrhea, unspecified  type  Comment: Worsening per family with ongoing concerns  Plan:   -Adding daily fiber  -Orders again for staff to record bowel movements   -Orders again for c-diff testing  -UA/UC  -Updated weight  -H. Pylori stool testing     (G47.10) Hypersomnia  Comment: Worse of medication  Plan:   -methylphenidate 2.5 mg po daily  -ACP referral     (M62.81) Generalized muscle weakness  Comment: More assistance with ADLs  Plan:  -[facility] to eval and treat for physical deconditioning and equipment needs      Total time with an established patient visit in the assisted livin minutes including discussions about the POC and care coordination with the patient and first contactTj. Greater than 50% of total time spent with counseling and coordinating care due to ongoing issue and family concerns       Electronically signed by:  ZAYNAB Hale CNP

## 2019-03-14 VITALS
DIASTOLIC BLOOD PRESSURE: 73 MMHG | TEMPERATURE: 98.8 F | SYSTOLIC BLOOD PRESSURE: 140 MMHG | RESPIRATION RATE: 16 BRPM | OXYGEN SATURATION: 98 % | HEART RATE: 78 BPM

## 2019-03-14 RX ORDER — METHYLPHENIDATE HYDROCHLORIDE 5 MG/1
2.5 TABLET ORAL DAILY
Qty: 30 TABLET | Refills: 0 | Status: SHIPPED | OUTPATIENT
Start: 2019-03-14 | End: 2019-05-07

## 2019-03-16 ENCOUNTER — TRANSFERRED RECORDS (OUTPATIENT)
Dept: HEALTH INFORMATION MANAGEMENT | Facility: CLINIC | Age: 84
End: 2019-03-16

## 2019-03-30 ENCOUNTER — TRANSFERRED RECORDS (OUTPATIENT)
Dept: HEALTH INFORMATION MANAGEMENT | Facility: CLINIC | Age: 84
End: 2019-03-30

## 2019-05-07 DIAGNOSIS — G47.10 HYPERSOMNIA: ICD-10-CM

## 2019-05-07 RX ORDER — METHYLPHENIDATE HYDROCHLORIDE 5 MG/1
TABLET ORAL
Qty: 30 TABLET | Refills: 0 | Status: SHIPPED | OUTPATIENT
Start: 2019-05-07 | End: 2019-01-01

## 2019-05-14 ENCOUNTER — DOCUMENTATION ONLY (OUTPATIENT)
Dept: OTHER | Facility: CLINIC | Age: 84
End: 2019-05-14

## 2019-05-22 NOTE — LETTER
5/22/2019        RE: [facility]  [address]  [address]  [place] [address]        [facility]  [place] Medical Record Number:  [id number]  Place of Service where encounter took place:  [facility] ([facility] [[id number]]  Chief Complaint   Patient presents with     RECHECK       HPI:    Salud Arroyo  is a 86 year old (2/20/1933), who is being seen today for an episodic care visit.  HPI information obtained from: {[facility] HPI:[id number]}. Today's concern is:  {[facility] DX:289497}    Past Medical and Surgical History reviewed in Epic today.    MEDICATIONS:  Current Outpatient Medications   Medication Sig Dispense Refill     amLODIPine (NORVASC) 5 MG tablet TAKE 1 TABLET BY MOUTH ONCE DAILY 28 tablet 98     ASPIRIN ADULT LOW STRENGTH 81 MG EC tablet TAKE 1 TABLET BY MOUTH ONCE DAILY 28 tablet 98     atorvastatin (LIPITOR) 20 MG tablet TAKE 1 TABLET BY MOUTH ONCE DAILY 31 tablet 98     escitalopram (LEXAPRO) 10 MG tablet TAKE 1 TABLET BY MOUTH ONCE DAILY 31 tablet 98     loperamide (IMODIUM) 2 MG capsule Take 2 mg by mouth daily And 4mg for acute diarrhea. And 2mg after each loose stool. Max 8 capsules per day (16mg)       losartan (COZAAR) 50 MG tablet TAKE 1 TABLET BY MOUTH ONCE DAILY 28 tablet 98     methylphenidate (RITALIN) 5 MG tablet TAKE ONE-HALF TABLET (2.5MG) BY MOUTH ONCE DAILY 30 tablet 0     Multiple Vitamins-Iron (TAB-A-GABY/IRON) TABS TAKE 1 TABLET BY MOUTH ONCE DAILY 28 tablet 98     omeprazole (PRILOSEC) 10 MG DR capsule TAKE 1 CAPSULE BY MOUTH ONCE DAILY 31 capsule 98     Psyllium (SM FIBER) 51.7 % POWD Take 1 teaspoonful by mouth daily 283 Bottle 11     ***    REVIEW OF SYSTEMS:  {ROS [facility]:546184}    Objective:  There were no vitals taken for this visit.  Exam:  {[facility]itentiary physical exam :407532}    Labs:   {fgslab:185464}    ASSESSMENT/PLAN:  {[facility] DX:753483}    {fgsorders:054997}  ***    {[facility] TIME SPENT:103220}  Electronically signed  by:  Jazz Dc CNA ***  {Providers Please double check the med list (in the plan section >> meds & orders tab) and Discontinue any of the meds flagged by the TC to be discontinued}          [facility]  [place] Medical Record Number:  [id number]  Place of Service where encounter took place:  [facility] [[id number]]  Chief Complaint   Patient presents with     RECHECK       HPI:    Salud Arroyo  is a 86 year old (2/20/1933), who is being seen today for an episodic care visit.  HPI information obtained from: facility chart records, facility staff and [facility] chart review.     Seen today for follow up. Did discuss ill fitting shoes with staff, dry skin. Ambulates with walker, somewhat unsteady 2/2 shoes?     PMH includes IBS both constipation and diarrhea, fecal urgency and incontinence.Had abdominal CT was unremarkable except for hepatic steatosis. C-diff and enteric stool studies were negative, had positive lab for H.Pylori in 2017 but further testing with EGD was determined negative and she did not tolerate treatment so it was stopped. Currently on multivitamin w/iron hgb is WNL. Concerning to family. Additional c-diff negative ordered, not performed. H-pylori 3/30/19 was negative. UA/UC was < 100,000 E-coli. Added daily fiber. No concerns from staff of excessive bowel movements.     Hypersomnia worse off medication and has improved back on methylphenidate.           Past Medical and Surgical History reviewed in Epic today.    MEDICATIONS:  Current Outpatient Medications   Medication Sig Dispense Refill     amLODIPine (NORVASC) 5 MG tablet TAKE 1 TABLET BY MOUTH ONCE DAILY 28 tablet 98     ASPIRIN ADULT LOW STRENGTH 81 MG EC tablet TAKE 1 TABLET BY MOUTH ONCE DAILY 28 tablet 98     atorvastatin (LIPITOR) 20 MG tablet TAKE 1 TABLET BY MOUTH ONCE DAILY 31 tablet 98     escitalopram (LEXAPRO) 10 MG tablet TAKE 1 TABLET BY MOUTH ONCE DAILY 31  tablet 98     loperamide (IMODIUM) 2 MG capsule Take 2 mg by mouth daily And 4mg for acute diarrhea. And 2mg after each loose stool. Max 8 capsules per day (16mg)       losartan (COZAAR) 50 MG tablet TAKE 1 TABLET BY MOUTH ONCE DAILY 28 tablet 98     methylphenidate (RITALIN) 5 MG tablet TAKE ONE-HALF TABLET (2.5MG) BY MOUTH ONCE DAILY 30 tablet 0     Multiple Vitamins-Iron (TAB-A-GABY/IRON) TABS TAKE 1 TABLET BY MOUTH ONCE DAILY 28 tablet 98     omeprazole (PRILOSEC) 10 MG DR capsule TAKE 1 CAPSULE BY MOUTH ONCE DAILY 31 capsule 98     Psyllium (SM FIBER) 51.7 % POWD Take 1 teaspoonful by mouth daily 283 Bottle 11     REVIEW OF SYSTEMS:  Unobtainable secondary to cognitive impairment but reports fine    Objective:  /70   Pulse 71   Temp 97.8  F (36.6  C)   Resp 16   SpO2 93%   Exam:  GENERAL APPEARANCE:  Alert, in no distress, cooperative and smiling, sleeping in bed  ENT:  Mouth and posterior oropharynx normal, dry mucous membranes  EYES:  EOM, conjunctivae, lids, pupils and irises normal  RESP:  respiratory effort and palpation of chest normal, lungs clear to auscultation   CV:  Palpation and auscultation of heart done , regular rate and rhythm, no murmur, rub, or gallop, trace edema  ABDOMEN:  normal bowel sounds, soft, nontender, distended   M/S:   Gait and Station at baseline with walker  SKIN:  Inspection of skin and subcutaneous tissue baseline  NEURO:   Cranial nerves 2-12 are normal tested and grossly at patient's baseline  PSYCH:  oriented X 2 but forgetful.     Labs:   CBC RESULTS:   Recent Labs   Lab Test 01/30/19 10/05/17  1443   WBC 6.7 6.8   RBC 4.62 5.23*   HGB 13.2 15.3   HCT 40.3 43.5   MCV 87 83   MCH 28.6 29.3   MCHC 32.8 35.2   RDW 14.3 14.0    286       Last Basic Metabolic Panel:  Recent Labs   Lab Test 01/30/19 02/04/18    140   POTASSIUM 4.1 4.4   CHLORIDE 109 106   PELON 8.9 9.3   CO2 25 27   BUN 25 13   CR 0.86 0.82   * 108*       Liver Function Studies -    Recent Labs   Lab Test 01/30/19 10/05/17  1443   PROTTOTAL 7.1 8.4   ALBUMIN 3.3* 4.1   BILITOTAL 0.5 1.0   ALKPHOS 64 100   AST 40 20   ALT 63* 45       TSH   Date Value Ref Range Status   01/30/2019 0.91 0.40 - 4.00 mU/L Final       No results found for: A1C    ASSESSMENT/PLAN:  (K58.0) Irritable bowel syndrome with diarrhea  (primary encounter diagnosis)  Comment: Chronic  Plan:   -Fiber daily  -Imodium daily and prn (no prn used)    (G47.10) Hypersomnia  Comment: Improved on medication   Plan:   -Ritalin 2.5 mg po daily    (I10) Benign essential hypertension  (Z86.73) History of CVA (cerebrovascular accident)  Comment: Stable  Plan:   -BP meds, statin and ASA for secondary prevention    (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

## 2019-05-22 NOTE — PROGRESS NOTES
[facility]  [facility] Medical Record Number:  [id number]  Place of Service where encounter took place:  [facility] ([facility] [[id number]]  Chief Complaint   Patient presents with     RECHECK       HPI:    Salud Arroyo  is a 86 year old (2/20/1933), who is being seen today for an episodic care visit.  HPI information obtained from: facility chart records, facility staff and [facility] chart review.     Seen today for follow up. Did discuss ill fitting shoes with staff, dry skin. Ambulates with walker, somewhat unsteady 2/2 shoes?     PMH includes IBS both constipation and diarrhea, fecal urgency and incontinence.Had abdominal CT was unremarkable except for hepatic steatosis. C-diff and enteric stool studies were negative, had positive lab for H.Pylori in 2017 but further testing with EGD was determined negative and she did not tolerate treatment so it was stopped. Currently on multivitamin w/iron hgb is WNL. Concerning to family. Additional c-diff negative ordered, not performed. H-pylori 3/30/19 was negative. UA/UC was < 100,000 E-coli. Added daily fiber. No concerns from staff of excessive bowel movements.     Hypersomnia worse off medication and has improved back on methylphenidate.           Past Medical and Surgical History reviewed in Epic today.    MEDICATIONS:  Current Outpatient Medications   Medication Sig Dispense Refill     amLODIPine (NORVASC) 5 MG tablet TAKE 1 TABLET BY MOUTH ONCE DAILY 28 tablet 98     ASPIRIN ADULT LOW STRENGTH 81 MG EC tablet TAKE 1 TABLET BY MOUTH ONCE DAILY 28 tablet 98     atorvastatin (LIPITOR) 20 MG tablet TAKE 1 TABLET BY MOUTH ONCE DAILY 31 tablet 98     escitalopram (LEXAPRO) 10 MG tablet TAKE 1 TABLET BY MOUTH ONCE DAILY 31 tablet 98     loperamide (IMODIUM) 2 MG capsule Take 2 mg by mouth daily And 4mg for acute diarrhea. And 2mg after each loose stool. Max 8 capsules per day (16mg)       losartan (COZAAR) 50 MG tablet TAKE 1  TABLET BY MOUTH ONCE DAILY 28 tablet 98     methylphenidate (RITALIN) 5 MG tablet TAKE ONE-HALF TABLET (2.5MG) BY MOUTH ONCE DAILY 30 tablet 0     Multiple Vitamins-Iron (TAB-A-GABY/IRON) TABS TAKE 1 TABLET BY MOUTH ONCE DAILY 28 tablet 98     omeprazole (PRILOSEC) 10 MG DR capsule TAKE 1 CAPSULE BY MOUTH ONCE DAILY 31 capsule 98     Psyllium (SM FIBER) 51.7 % POWD Take 1 teaspoonful by mouth daily 283 Bottle 11     REVIEW OF SYSTEMS:  Unobtainable secondary to cognitive impairment but reports fine    Objective:  /70   Pulse 71   Temp 97.8  F (36.6  C)   Resp 16   SpO2 93%   Exam:  GENERAL APPEARANCE:  Alert, in no distress, cooperative and smiling, sleeping in bed  ENT:  Mouth and posterior oropharynx normal, dry mucous membranes  EYES:  EOM, conjunctivae, lids, pupils and irises normal  RESP:  respiratory effort and palpation of chest normal, lungs clear to auscultation   CV:  Palpation and auscultation of heart done , regular rate and rhythm, no murmur, rub, or gallop, trace edema  ABDOMEN:  normal bowel sounds, soft, nontender, distended   M/S:   Gait and Station at baseline with walker  SKIN:  Inspection of skin and subcutaneous tissue baseline  NEURO:   Cranial nerves 2-12 are normal tested and grossly at patient's baseline  PSYCH:  oriented X 2 but forgetful.     Labs:   CBC RESULTS:   Recent Labs   Lab Test 01/30/19 10/05/17  1443   WBC 6.7 6.8   RBC 4.62 5.23*   HGB 13.2 15.3   HCT 40.3 43.5   MCV 87 83   MCH 28.6 29.3   MCHC 32.8 35.2   RDW 14.3 14.0    286       Last Basic Metabolic Panel:  Recent Labs   Lab Test 01/30/19 02/04/18    140   POTASSIUM 4.1 4.4   CHLORIDE 109 106   PELNO 8.9 9.3   CO2 25 27   BUN 25 13   CR 0.86 0.82   * 108*       Liver Function Studies -   Recent Labs   Lab Test 01/30/19 10/05/17  1443   PROTTOTAL 7.1 8.4   ALBUMIN 3.3* 4.1   BILITOTAL 0.5 1.0   ALKPHOS 64 100   AST 40 20   ALT 63* 45       TSH   Date Value Ref Range Status   01/30/2019 0.91 0.40  - 4.00 mU/L Final       No results found for: A1C    ASSESSMENT/PLAN:  (K58.0) Irritable bowel syndrome with diarrhea  (primary encounter diagnosis)  Comment: Chronic  Plan:   -Fiber daily  -Imodium daily and prn (no prn used)    (G47.10) Hypersomnia  Comment: Improved on medication   Plan:   -Ritalin 2.5 mg po daily    (I10) Benign essential hypertension  (Z86.73) History of CVA (cerebrovascular accident)  Comment: Stable  Plan:   -BP meds, statin and ASA for secondary prevention    (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

## 2019-05-22 NOTE — LETTER
5/22/2019        RE: Salud [facility] [address]  [address]  [address]  [place] [address]        [facility]  [place] Medical Record Number:  [id number]  Place of Service where encounter took place:  [facility] [[id number]]  Chief Complaint   Patient presents with     RECHECK       HPI:    Salud Arroyo  is a 86 year old (2/20/1933), who is being seen today for an episodic care visit.  HPI information obtained from: facility chart records, facility staff and [facility] chart review.     Seen today for follow up. Did discuss ill fitting shoes with staff, dry skin. Ambulates with walker, somewhat unsteady 2/2 shoes?     PMH includes IBS both constipation and diarrhea, fecal urgency and incontinence.Had abdominal CT was unremarkable except for hepatic steatosis. C-diff and enteric stool studies were negative, had positive lab for H.Pylori in 2017 but further testing with EGD was determined negative and she did not tolerate treatment so it was stopped. Currently on multivitamin w/iron hgb is WNL. Concerning to family. Additional c-diff negative ordered, not performed. H-pylori 3/30/19 was negative. UA/UC was < 100,000 E-coli. Added daily fiber. No concerns from staff of excessive bowel movements.     Hypersomnia worse off medication and has improved back on methylphenidate.           Past Medical and Surgical History reviewed in Epic today.    MEDICATIONS:  Current Outpatient Medications   Medication Sig Dispense Refill     amLODIPine (NORVASC) 5 MG tablet TAKE 1 TABLET BY MOUTH ONCE DAILY 28 tablet 98     ASPIRIN ADULT LOW STRENGTH 81 MG EC tablet TAKE 1 TABLET BY MOUTH ONCE DAILY 28 tablet 98     atorvastatin (LIPITOR) 20 MG tablet TAKE 1 TABLET BY MOUTH ONCE DAILY 31 tablet 98     escitalopram (LEXAPRO) 10 MG tablet TAKE 1 TABLET BY MOUTH ONCE DAILY 31 tablet 98     loperamide (IMODIUM) 2 MG capsule Take 2 mg by mouth daily And 4mg for acute  diarrhea. And 2mg after each loose stool. Max 8 capsules per day (16mg)       losartan (COZAAR) 50 MG tablet TAKE 1 TABLET BY MOUTH ONCE DAILY 28 tablet 98     methylphenidate (RITALIN) 5 MG tablet TAKE ONE-HALF TABLET (2.5MG) BY MOUTH ONCE DAILY 30 tablet 0     Multiple Vitamins-Iron (TAB-A-GABY/IRON) TABS TAKE 1 TABLET BY MOUTH ONCE DAILY 28 tablet 98     omeprazole (PRILOSEC) 10 MG DR capsule TAKE 1 CAPSULE BY MOUTH ONCE DAILY 31 capsule 98     Psyllium (SM FIBER) 51.7 % POWD Take 1 teaspoonful by mouth daily 283 Bottle 11     REVIEW OF SYSTEMS:  Unobtainable secondary to cognitive impairment but reports fine    Objective:  /70   Pulse 71   Temp 97.8  F (36.6  C)   Resp 16   SpO2 93%   Exam:  GENERAL APPEARANCE:  Alert, in no distress, cooperative and smiling, sleeping in bed  ENT:  Mouth and posterior oropharynx normal, dry mucous membranes  EYES:  EOM, conjunctivae, lids, pupils and irises normal  RESP:  respiratory effort and palpation of chest normal, lungs clear to auscultation   CV:  Palpation and auscultation of heart done , regular rate and rhythm, no murmur, rub, or gallop, trace edema  ABDOMEN:  normal bowel sounds, soft, nontender, distended   M/S:   Gait and Station at baseline with walker  SKIN:  Inspection of skin and subcutaneous tissue baseline  NEURO:   Cranial nerves 2-12 are normal tested and grossly at patient's baseline  PSYCH:  oriented X 2 but forgetful.     Labs:   CBC RESULTS:   Recent Labs   Lab Test 01/30/19 10/05/17  1443   WBC 6.7 6.8   RBC 4.62 5.23*   HGB 13.2 15.3   HCT 40.3 43.5   MCV 87 83   MCH 28.6 29.3   MCHC 32.8 35.2   RDW 14.3 14.0    286       Last Basic Metabolic Panel:  Recent Labs   Lab Test 01/30/19 02/04/18    140   POTASSIUM 4.1 4.4   CHLORIDE 109 106   PELON 8.9 9.3   CO2 25 27   BUN 25 13   CR 0.86 0.82   * 108*       Liver Function Studies -   Recent Labs   Lab Test 01/30/19 10/05/17  1443   PROTTOTAL 7.1 8.4   ALBUMIN 3.3* 4.1    BILITOTAL 0.5 1.0   ALKPHOS 64 100   AST 40 20   ALT 63* 45       TSH   Date Value Ref Range Status   01/30/2019 0.91 0.40 - 4.00 mU/L Final       No results found for: A1C    ASSESSMENT/PLAN:  (K58.0) Irritable bowel syndrome with diarrhea  (primary encounter diagnosis)  Comment: Chronic  Plan:   -Fiber daily  -Imodium daily and prn (no prn used)    (G47.10) Hypersomnia  Comment: Improved on medication   Plan:   -Ritalin 2.5 mg po daily    (I10) Benign essential hypertension  (Z86.73) History of CVA (cerebrovascular accident)  Comment: Stable  Plan:   -BP meds, statin and ASA for secondary prevention    (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

## 2019-07-24 NOTE — PROGRESS NOTES
[facility]  [facility] Record Number:  [id number]  Place of Service where encounter took place:  [facility] [[id number]]  Chief Complaint   Patient presents with     RECHECK       HPI:    Salud Arroyo  is a 86 year old (2/20/1933), who is being seen today for an episodic care visit.  HPI information obtained from: facility chart records and [facility] chart review.     Seen today for follow up to reddened eye, HTN and weight gain. Reported by family friend that Salud was with reddened eyes at her recent visit. Not noticed today, denies eye pain, headache. Is sleeping mid afternoon which is baseline. Wakes easy and then asks to lay back down after visit. Frequent larger amounts of urine.       Past Medical and Surgical History reviewed in Epic today.    MEDICATIONS:  Current Outpatient Medications   Medication Sig Dispense Refill     amLODIPine (NORVASC) 5 MG tablet TAKE 1 TABLET BY MOUTH ONCE DAILY 28 tablet 98     ASPIRIN ADULT LOW STRENGTH 81 MG EC tablet TAKE 1 TABLET BY MOUTH ONCE DAILY 28 tablet 98     atorvastatin (LIPITOR) 20 MG tablet TAKE 1 TABLET BY MOUTH ONCE DAILY 31 tablet 98     escitalopram (LEXAPRO) 10 MG tablet TAKE 1 TABLET BY MOUTH ONCE DAILY 31 tablet 98     loperamide (IMODIUM) 2 MG capsule Take 2 mg by mouth daily And 4mg for acute diarrhea. And 2mg after each loose stool. Max 8 capsules per day (16mg)       losartan (COZAAR) 50 MG tablet TAKE 1 TABLET BY MOUTH ONCE DAILY 28 tablet 98     methylphenidate (RITALIN) 5 MG tablet Take 0.5 tablets (2.5 mg) by mouth daily 15 tablet 0     Multiple Vitamins-Iron (TAB-A-GBAY/IRON) TABS TAKE 1 TABLET BY MOUTH ONCE DAILY 28 tablet 98     omeprazole (PRILOSEC) 10 MG DR capsule TAKE 1 CAPSULE BY MOUTH ONCE DAILY 31 capsule 98     Psyllium (SM FIBER) 51.7 % POWD Take 1 teaspoonful by mouth daily 283 Bottle 11     REVIEW OF SYSTEMS:  Limited secondary to cognitive impairment but today pt  reports fine    Objective:  BP (!) 150/80   Pulse 86   Temp 98.7  F (37.1  C)   Resp 16   SpO2 97%   Exam:  GENERAL APPEARANCE:  Alert, in no distress, cooperative and smiling, sleeping in bed  ENT:  Mouth and posterior oropharynx normal, dry mucous membranes  EYES:  EOM, conjunctivae, lids, pupils and irises normal  RESP:  respiratory effort and palpation of chest normal, lungs clear to auscultation   CV:  Palpation and auscultation of heart done , regular rate and rhythm, no murmur, rub, or gallop, trace- edema in ankles and feet  ABDOMEN:  normal bowel sounds, soft, nontender, distended   M/S:   Gait and Station at baseline with walker  SKIN:  Inspection of skin and subcutaneous tissue baseline  NEURO:   Cranial nerves 2-12 are normal tested and grossly at patient's baseline  PSYCH:  oriented X 2 but forgetful    Labs:   CBC RESULTS:   Recent Labs   Lab Test 01/30/19 10/05/17  1443   WBC 6.7 6.8   RBC 4.62 5.23*   HGB 13.2 15.3   HCT 40.3 43.5   MCV 87 83   MCH 28.6 29.3   MCHC 32.8 35.2   RDW 14.3 14.0    286       Last Basic Metabolic Panel:  Recent Labs   Lab Test 01/30/19 02/04/18    140   POTASSIUM 4.1 4.4   CHLORIDE 109 106   PELON 8.9 9.3   CO2 25 27   BUN 25 13   CR 0.86 0.82   * 108*       Liver Function Studies -   Recent Labs   Lab Test 01/30/19 10/05/17  1443   PROTTOTAL 7.1 8.4   ALBUMIN 3.3* 4.1   BILITOTAL 0.5 1.0   ALKPHOS 64 100   AST 40 20   ALT 63* 45       TSH   Date Value Ref Range Status   01/30/2019 0.91 0.40 - 4.00 mU/L Final       No results found for: A1C    ASSESSMENT/PLAN:  (R63.5) Weight gain  (primary encounter diagnosis)  Comment: Appears with weight gain  Plan:   -facility to obtain updated weight  -discontinue multivitamin     (I10) Benign essential hypertension  Comment: Elevated  Plan:   -Increase losartan to 100 mg po daily from 50 mg po daily  -Norvasc 5 mg po daily  -BMP     (R35.8) Polyuria  Comment: frequent large amounts of urine, elevated blood  glucose  Plan:   -CBC and A1C          Electronically signed by:  ZAYNAB Hale CNP

## 2019-07-31 NOTE — PROGRESS NOTES
Salud Arroyo is a 86 year old female seen July 31, 2018 at [facility] where she has resided for 2 years (admit to [facility] 6/2017) seen to follow up hypersomnolence, dementia and HTN.     She has had progressive cognitive decline, with fairly prominent loss of initiation in which she will spend a good deal of her time lying in bed.    This has been only minimally improved with addition of Ritalin.  Today she is seen in her apartment, sleeping in bed but fully dressed.   AL staff reports she will get up for meals, but get back in bed until the next meal.    able to get her up later in the afternoon, ambulates out of her room with FWW.    States she feels okay, denies any current pain.        She has had GI workup for diarrhea and dyspepsia.   H pylori negative most recently.   She eats well, has incontinence of bowel and bladder.        Past Medical History:   Diagnosis Date     Cancer (H)      Cataract      Granuloma annulare      HTN (hypertension)      Hyperlipidemia      IFG (impaired fasting glucose)      Irritable bowel syndrome      Osteopenia      Uveitis    Late onset dementia, Alzheimer's type  Hypersomnolence     SH:   , lives at [facility]   She has 3 children: Tj Sterling, Analy    ROS: negative other than above, although limited history.   CPT 4.3      She is incontinent and needs assist with deann cares.     Wt Readings from Last 5 Encounters:   07/24/19 81.9 kg (180 lb 9.6 oz)   01/23/19 74.8 kg (165 lb)   10/24/18 74.8 kg (165 lb)   04/18/18 64.9 kg (143 lb)   11/20/17 64.9 kg (143 lb)        EXAM:  Pleasant, NAD  BP (!) 152/78   Pulse 71   Temp 97.8  F (36.6  C)   Resp 14   Wt 81.9 kg (180 lb 9.6 oz)   BMI 31.99 kg/m     Neck supple without adenopathy  Lungs with decreased BS, no rales or wheeze  Heart RRR s1s2 distant  Abd soft, NT, no distention, +BS  Ext without edema  Neuro: non-focal, no tremor or stiffness, limited hx  Psych: affect  okay.     Labs reviewed.      IMP/PLAN:  (G47.10) Hypersomnia   Comment: multifactorial and including HÉCTOR, medications, depression and dementia, with loss of initiation   Plan: Ritalin 2.5 mg/day; family feels this helps somewhat.      (F43.21) Adjustment disorder with depressed mood  Comment: more settled in Memory Care  Plan: continue escitalopram      (G47.33) HÉCTOR (obstructive sleep apnea)  Comment: doesn't routinely uses CPAP  Plan: encourage nightly use for as many hours as possible    (Z86.73) History of CVA (cerebrovascular accident)  Comment: by MRI results  Plan: ASA, statin, bp meds for secondary prevention.        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     Plan: [facility] Memory Care unit support for meds, meals, activity    (K58.0) Irritable bowel syndrome with diarrhea  (R10.13) Dyspepsia  Comment: recurrent GI symptoms.   Incontinent of bowel and bladder.   Plan: continue omeprazole at 10 mg/day    Fiber, Imodium scheduled and prn  Eating well, can discontinue MVI     (I10) Essential HTN  Comment:   BP Readings from Last 3 Encounters:   07/24/19 (!) 152/78   05/23/19 130/70   03/13/19 140/73      Plan: continue losartan, amlodipine    Sandra Bravo MD

## 2019-07-31 NOTE — LETTER
7/31/2019        RE: Salud Arroyo  [place] [address]  [address]  [address]  [place] [address]        Salud Arroyo is a 86 year old female seen July 31, 2018 at [facility] [place] where she has resided for 2 years (admit to [facility] 6/2017) seen to follow up hypersomnolence, dementia and HTN.     She has had progressive cognitive decline, with fairly prominent loss of initiation in which she will spend a good deal of her time lying in bed.    This has been only minimally improved with addition of Ritalin.  Today she is seen in her apartment, sleeping in bed but fully dressed.   AL staff reports she will get up for meals, but get back in bed until the next meal.    able to get her up later in the afternoon, ambulates out of her room with FWW.    States she feels okay, denies any current pain.        She has had GI workup for diarrhea and dyspepsia.   H pylori negative most recently.   She eats well, has incontinence of bowel and bladder.        Past Medical History:   Diagnosis Date     Cancer (H)      Cataract      Granuloma annulare      HTN (hypertension)      Hyperlipidemia      IFG (impaired fasting glucose)      Irritable bowel syndrome      Osteopenia      Uveitis    Late onset dementia, Alzheimer's type  Hypersomnolence     SH:   , lives at [facility]   She has 3 children: Asher, jT, Analy    ROS: negative other than above, although limited history.   CPT 4.3      She is incontinent and needs assist with deann cares.     Wt Readings from Last 5 Encounters:   07/24/19 81.9 kg (180 lb 9.6 oz)   01/23/19 74.8 kg (165 lb)   10/24/18 74.8 kg (165 lb)   04/18/18 64.9 kg (143 lb)   11/20/17 64.9 kg (143 lb)        EXAM:  Pleasant, NAD  BP (!) 152/78   Pulse 71   Temp 97.8  F (36.6  C)   Resp 14   Wt 81.9 kg (180 lb 9.6 oz)   BMI 31.99 kg/m      Neck supple without adenopathy  Lungs with decreased BS, no rales or wheeze  Heart RRR s1s2 distant  Abd  soft, NT, no distention, +BS  Ext without edema  Neuro: non-focal, no tremor or stiffness, limited hx  Psych: affect okay.     Labs reviewed.      IMP/PLAN:  (G47.10) Hypersomnia   Comment: multifactorial and including HÉCTOR, medications, depression and dementia, with loss of initiation   Plan: Ritalin 2.5 mg/day; family feels this helps somewhat.      (F43.21) Adjustment disorder with depressed mood  Comment: more settled in Memory Care  Plan: continue escitalopram      (G47.33) HÉCTOR (obstructive sleep apnea)  Comment: doesn't routinely uses CPAP  Plan: encourage nightly use for as many hours as possible    (Z86.73) History of CVA (cerebrovascular accident)  Comment: by MRI results  Plan: ASA, statin, bp meds for secondary prevention.        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     Plan: [facility] Memory Care unit support for meds, meals, activity    (K58.0) Irritable bowel syndrome with diarrhea  (R10.13) Dyspepsia  Comment: recurrent GI symptoms.   Incontinent of bowel and bladder.   Plan: continue omeprazole at 10 mg/day    Fiber, Imodium scheduled and prn  Eating well, can discontinue MVI     (I10) Essential HTN  Comment:   BP Readings from Last 3 Encounters:   07/24/19 (!) 152/78   05/23/19 130/70   03/13/19 140/73      Plan: continue losartan, amlodipine    Sandra Bravo MD         Sincerely,        Sandra Bravo MD

## 2019-09-04 NOTE — LETTER
9/4/2019        RE: [facility] [address]  [address]  [address]  [place] [address]          [facility]  [facility] Medical Record Number:  [id number]  Place of Service where encounter took place:  [facility] [[id number]]  Chief Complaint   Patient presents with     RECHECK       HPI:    Salud Arroyo  is a 86 year old (2/20/1933), who is being seen today for an episodic care visit.  HPI information obtained from: facility chart records and [facility] chart review.     Seen today for follow up to reddened eye, HTN and weight gain. Reported by family friend that Salud was with reddened eyes at her recent visit. Not noticed today, denies eye pain, headache. Is sleeping mid afternoon which is baseline. Wakes easy and then asks to lay back down after visit. Frequent larger amounts of urine.       Past Medical and Surgical History reviewed in Epic today.    MEDICATIONS:  Current Outpatient Medications   Medication Sig Dispense Refill     amLODIPine (NORVASC) 5 MG tablet TAKE 1 TABLET BY MOUTH ONCE DAILY 28 tablet 98     ASPIRIN ADULT LOW STRENGTH 81 MG EC tablet TAKE 1 TABLET BY MOUTH ONCE DAILY 28 tablet 98     atorvastatin (LIPITOR) 20 MG tablet TAKE 1 TABLET BY MOUTH ONCE DAILY 31 tablet 98     escitalopram (LEXAPRO) 10 MG tablet TAKE 1 TABLET BY MOUTH ONCE DAILY 31 tablet 98     loperamide (IMODIUM) 2 MG capsule Take 2 mg by mouth daily And 4mg for acute diarrhea. And 2mg after each loose stool. Max 8 capsules per day (16mg)       losartan (COZAAR) 50 MG tablet TAKE 1 TABLET BY MOUTH ONCE DAILY 28 tablet 98     methylphenidate (RITALIN) 5 MG tablet Take 0.5 tablets (2.5 mg) by mouth daily 15 tablet 0     Multiple Vitamins-Iron (TAB-A-GABY/IRON) TABS TAKE 1 TABLET BY MOUTH ONCE DAILY 28 tablet 98     omeprazole (PRILOSEC) 10 MG DR capsule TAKE 1 CAPSULE BY MOUTH ONCE DAILY 31 capsule 98     Psyllium (SM FIBER) 51.7 % POWD Take 1  teaspoonful by mouth daily 283 Bottle 11     REVIEW OF SYSTEMS:  Limited secondary to cognitive impairment but today pt reports fine    Objective:  BP (!) 150/80   Pulse 86   Temp 98.7  F (37.1  C)   Resp 16   SpO2 97%   Exam:  GENERAL APPEARANCE:  Alert, in no distress, cooperative and smiling, sleeping in bed  ENT:  Mouth and posterior oropharynx normal, dry mucous membranes  EYES:  EOM, conjunctivae, lids, pupils and irises normal  RESP:  respiratory effort and palpation of chest normal, lungs clear to auscultation   CV:  Palpation and auscultation of heart done , regular rate and rhythm, no murmur, rub, or gallop, trace- edema in ankles and feet  ABDOMEN:  normal bowel sounds, soft, nontender, distended   M/S:   Gait and Station at baseline with walker  SKIN:  Inspection of skin and subcutaneous tissue baseline  NEURO:   Cranial nerves 2-12 are normal tested and grossly at patient's baseline  PSYCH:  oriented X 2 but forgetful    Labs:   CBC RESULTS:   Recent Labs   Lab Test 01/30/19 10/05/17  1443   WBC 6.7 6.8   RBC 4.62 5.23*   HGB 13.2 15.3   HCT 40.3 43.5   MCV 87 83   MCH 28.6 29.3   MCHC 32.8 35.2   RDW 14.3 14.0    286       Last Basic Metabolic Panel:  Recent Labs   Lab Test 01/30/19 02/04/18    140   POTASSIUM 4.1 4.4   CHLORIDE 109 106   PELON 8.9 9.3   CO2 25 27   BUN 25 13   CR 0.86 0.82   * 108*       Liver Function Studies -   Recent Labs   Lab Test 01/30/19 10/05/17  1443   PROTTOTAL 7.1 8.4   ALBUMIN 3.3* 4.1   BILITOTAL 0.5 1.0   ALKPHOS 64 100   AST 40 20   ALT 63* 45       TSH   Date Value Ref Range Status   01/30/2019 0.91 0.40 - 4.00 mU/L Final       No results found for: A1C    ASSESSMENT/PLAN:  (R63.5) Weight gain  (primary encounter diagnosis)  Comment: Appears with weight gain  Plan:   -facility to obtain updated weight  -discontinue multivitamin     (I10) Benign essential hypertension  Comment: Elevated  Plan:   -Increase losartan to 100 mg po daily from 50 mg po  daily  -Norvasc 5 mg po daily  -BMP     (R35.8) Polyuria  Comment: frequent large amounts of urine, elevated blood glucose  Plan:   -CBC and A1C          Electronically signed by:  ZAYNAB Hale CNP               Sincerely,        ZAYNAB Hlae CNP

## 2019-11-06 PROBLEM — E11.9 DIABETES MELLITUS, TYPE 2 (H): Status: ACTIVE | Noted: 2019-01-01

## 2019-11-06 NOTE — PROGRESS NOTES
[facility]  [facility] Medical Record Number:  [id number]  Place of Service where encounter took place:  [facility] [[id number]]  Chief Complaint   Patient presents with     Nursing Home Acute       HPI:    Salud Arroyo  is a 86 year old (2/20/1933), who is being seen today for an episodic care visit.  HPI information obtained from: facility chart records, facility staff and [facility] chart review. Today's concern is:    First seen today kneeing in front of bed. Was on the way to another room. Ortho HI Monsivais present and appreciate his assistance for evaluation post unwitnessed fall. She is reporting her knees are hurting and it's unclear how long was in this position. She did not call for help. Nursing reports this is her third fall since 10/11/19. Ok with ROM on exam.    Continues with weight gain and need updated for chart. Could be affecting balance/mobility.     BP elevated post fall. Losartan increased from 50 to 100 mg po daily 9/2019. Also norvasc daily.     Past Medical and Surgical History reviewed in Epic today.    MEDICATIONS:  Current Outpatient Medications   Medication Sig Dispense Refill     amLODIPine (NORVASC) 5 MG tablet TAKE 1 TABLET BY MOUTH ONCE DAILY 28 tablet 98     ASPIRIN ADULT LOW STRENGTH 81 MG EC tablet TAKE 1 TABLET BY MOUTH ONCE DAILY 28 tablet 98     atorvastatin (LIPITOR) 20 MG tablet TAKE 1 TABLET BY MOUTH ONCE DAILY 31 tablet 98     escitalopram (LEXAPRO) 10 MG tablet TAKE 1 TABLET BY MOUTH ONCE DAILY 31 tablet 98     loperamide (IMODIUM) 2 MG capsule Take 2 mg by mouth daily And 4mg for acute diarrhea. And 2mg after each loose stool. Max 8 capsules per day (16mg)       losartan (COZAAR) 50 MG tablet Take 2 tablets (100 mg) by mouth daily 28 tablet 98     methylphenidate (RITALIN) 5 MG tablet TAKE ONE-HALF TABLET (2.5MG) BY MOUTH DAILY 30 tablet 0     Multiple Vitamins-Iron (TAB-A-GABY/IRON) TABS TAKE 1 TABLET BY MOUTH ONCE  DAILY 28 tablet 98     omeprazole (PRILOSEC) 10 MG DR capsule TAKE 1 CAPSULE BY MOUTH ONCE DAILY 31 capsule 98     Psyllium (SM FIBER) 51.7 % POWD Take 1 teaspoonful by mouth daily 283 Bottle 11     REVIEW OF SYSTEMS:  Limited secondary to cognitive impairment but today pt reports I would like to lay down    Objective:  BP (!) 168/99   Pulse 79   Temp 98.1  F (36.7  C)   Resp 16   SpO2 97%   Exam:  GENERAL APPEARANCE:  Alert, in no acute distress  ENT:  Mouth and posterior oropharynx normal, dry mucous membranes  EYES:  EOM, conjunctivae, lids, pupils and irises normal  RESP:  respiratory effort and palpation of chest normal, lungs clear to auscultation   CV:  Palpation and auscultation of heart done , regular rate and rhythm, no murmur, rub, or gallop, trace- edema in ankles and feet  ABDOMEN:  normal bowel sounds, soft, nontender, distended   M/S:   Gait and Station below baseline with walker, ok ROM to LE bilateral, able to bear weight, no external rotation, shortened limb  SKIN:  Inspection of skin and subcutaneous tissue baseline to visualized areas  NEURO:   Cranial nerves 2-12 are normal tested and grossly at patient's baseline  PSYCH:  oriented X 2 but forgetful    Labs:   CBC RESULTS:   Recent Labs   Lab Test 09/11/19 01/30/19   WBC 5.7 6.7   RBC 4.57 4.62   HGB 12.7 13.2   HCT 40.2 40.3   MCV 88 87   MCH 27.8 28.6   MCHC 31.6 32.8   RDW 14.5 14.3    198       Last Basic Metabolic Panel:  Recent Labs   Lab Test 09/11/19 01/30/19    141   POTASSIUM 4.1 4.1   CHLORIDE 105 109   PELON 9.8 8.9   CO2 25 25   BUN 14 25   CR 0.74 0.86   * 134*       Liver Function Studies -   Recent Labs   Lab Test 01/30/19 10/05/17  1443   PROTTOTAL 7.1 8.4   ALBUMIN 3.3* 4.1   BILITOTAL 0.5 1.0   ALKPHOS 64 100   AST 40 20   ALT 63* 45       TSH   Date Value Ref Range Status   01/30/2019 0.91 0.40 - 4.00 mU/L Final       Lab Results   Component Value Date    A1C 6.6 09/11/2019     ASSESSMENT/PLAN:  (R29.6)  Falls frequently  (primary encounter diagnosis)  (R52) Pain  Comment: increase of falls  Plan:   -PT/OT to eval and treat  -Tylenol prn for pain    (R63.5) Weight gain  Comment: trending up at last check   Plan:   -Updated weight from facility  -can discontinue MVI    (I10) Benign essential hypertension  Comment: elevated at times  Plan:   -BP/HR x 1 week  -Amlodipine 5 mg po daily  -Losartan 100 mg po daily  -BMP periodically     (Z86.73) History of CVA  Comment: from MRI  Plan:  -ASA, statin, bp meds for secondary prevention       (G47.10) Hypersomnia  Comment: with advanced dementia  Plan:   -family notices some improvement with ritalin        Electronically signed by:  ZAYNAB Hale CNP

## 2019-11-06 NOTE — LETTER
11/6/2019        RE: Salud Martinez On [address]  [address]  [address]  [place] [address]        [facility]  [facility] Record Number:  [id number]  Place of Service where encounter took place:  [facility] [[id number]]  Chief Complaint   Patient presents with     Nursing Home Acute       HPI:    Salud Arroyo  is a 86 year old (2/20/1933), who is being seen today for an episodic care visit.  HPI information obtained from: facility chart records, facility staff and [facility] chart review. Today's concern is:    First seen today kneeing in front of bed. Was on the way to another room. Ortho HI Monsivais present and appreciate his assistance for evaluation post unwitnessed fall. She is reporting her knees are hurting and it's unclear how long was in this position. She did not call for help. Nursing reports this is her third fall since 10/11/19. Ok with ROM on exam.    Continues with weight gain and need updated for chart. Could be affecting balance/mobility.     BP elevated post fall. Losartan increased from 50 to 100 mg po daily 9/2019. Also norvasc daily.     Past Medical and Surgical History reviewed in Epic today.    MEDICATIONS:  Current Outpatient Medications   Medication Sig Dispense Refill     amLODIPine (NORVASC) 5 MG tablet TAKE 1 TABLET BY MOUTH ONCE DAILY 28 tablet 98     ASPIRIN ADULT LOW STRENGTH 81 MG EC tablet TAKE 1 TABLET BY MOUTH ONCE DAILY 28 tablet 98     atorvastatin (LIPITOR) 20 MG tablet TAKE 1 TABLET BY MOUTH ONCE DAILY 31 tablet 98     escitalopram (LEXAPRO) 10 MG tablet TAKE 1 TABLET BY MOUTH ONCE DAILY 31 tablet 98     loperamide (IMODIUM) 2 MG capsule Take 2 mg by mouth daily And 4mg for acute diarrhea. And 2mg after each loose stool. Max 8 capsules per day (16mg)       losartan (COZAAR) 50 MG tablet Take 2 tablets (100 mg) by mouth daily 28 tablet 98     methylphenidate (RITALIN) 5 MG tablet TAKE ONE-HALF TABLET  (2.5MG) BY MOUTH DAILY 30 tablet 0     Multiple Vitamins-Iron (TAB-A-GABY/IRON) TABS TAKE 1 TABLET BY MOUTH ONCE DAILY 28 tablet 98     omeprazole (PRILOSEC) 10 MG DR capsule TAKE 1 CAPSULE BY MOUTH ONCE DAILY 31 capsule 98     Psyllium (SM FIBER) 51.7 % POWD Take 1 teaspoonful by mouth daily 283 Bottle 11     REVIEW OF SYSTEMS:  Limited secondary to cognitive impairment but today pt reports I would like to lay down    Objective:  BP (!) 168/99   Pulse 79   Temp 98.1  F (36.7  C)   Resp 16   SpO2 97%   Exam:  GENERAL APPEARANCE:  Alert, in no acute distress  ENT:  Mouth and posterior oropharynx normal, dry mucous membranes  EYES:  EOM, conjunctivae, lids, pupils and irises normal  RESP:  respiratory effort and palpation of chest normal, lungs clear to auscultation   CV:  Palpation and auscultation of heart done , regular rate and rhythm, no murmur, rub, or gallop, trace- edema in ankles and feet  ABDOMEN:  normal bowel sounds, soft, nontender, distended   M/S:   Gait and Station below baseline with walker, ok ROM to LE bilateral, able to bear weight, no external rotation, shortened limb  SKIN:  Inspection of skin and subcutaneous tissue baseline to visualized areas  NEURO:   Cranial nerves 2-12 are normal tested and grossly at patient's baseline  PSYCH:  oriented X 2 but forgetful    Labs:   CBC RESULTS:   Recent Labs   Lab Test 09/11/19 01/30/19   WBC 5.7 6.7   RBC 4.57 4.62   HGB 12.7 13.2   HCT 40.2 40.3   MCV 88 87   MCH 27.8 28.6   MCHC 31.6 32.8   RDW 14.5 14.3    198       Last Basic Metabolic Panel:  Recent Labs   Lab Test 09/11/19 01/30/19    141   POTASSIUM 4.1 4.1   CHLORIDE 105 109   PELON 9.8 8.9   CO2 25 25   BUN 14 25   CR 0.74 0.86   * 134*       Liver Function Studies -   Recent Labs   Lab Test 01/30/19 10/05/17  1443   PROTTOTAL 7.1 8.4   ALBUMIN 3.3* 4.1   BILITOTAL 0.5 1.0   ALKPHOS 64 100   AST 40 20   ALT 63* 45       TSH   Date Value Ref Range Status   01/30/2019 0.91  0.40 - 4.00 mU/L Final       Lab Results   Component Value Date    A1C 6.6 09/11/2019     ASSESSMENT/PLAN:  (R29.6) Falls frequently  (primary encounter diagnosis)  (R52) Pain  Comment: increase of falls  Plan:   -PT/OT to eval and treat  -Tylenol prn for pain    (R63.5) Weight gain  Comment: trending up at last check   Plan:   -Updated weight from facility  -can discontinue MVI    (I10) Benign essential hypertension  Comment: elevated at times  Plan:   -BP/HR x 1 week  -Amlodipine 5 mg po daily  -Losartan 100 mg po daily  -BMP periodically     (Z86.73) History of CVA  Comment: from MRI  Plan:  -ASA, statin, bp meds for secondary prevention       (G47.10) Hypersomnia  Comment: with advanced dementia  Plan:   -family notices some improvement with ritalin        Electronically signed by:  ZAYNAB Hale CNP               Sincerely,        ZAYNAB Hale CNP

## 2019-12-31 NOTE — PROGRESS NOTES
[facility]  Chief Complaint   Patient presents with     Annual Comprehensive Nursing Home     [place] Record Number:  [id number]  Place of Service where encounter took place:  [facility] [[id number]]    HPI:    Salud Arroyo  is a 86 year old  (2/20/1933), who is being seen today for an annual comprehensive visit. HPI information obtained from: facility chart records and [facility] chart review.  Today's concerns are:    Continues with elevated BP at times. Losartan increased from 50 to 100 mg po daily 9/2019. Also takes Norvasc daily. Ongoing weight gain could be contributor.     Flat affect and spends most days/night in bed. Staff reporting they are getting her out more in community room for activities. Hypersomnia with advanced dementia.    Hx of falls with self transfers but none noted this month.     Hx of diarrhea and on daily dose of imodium. Unable to given reliable bowel history and unclear if bowel records are accurate.       ALLERGIES: Amoxicillin; Lisinopril; Ranitidine; and Zofran [ondansetron]  PAST MEDICAL HISTORY:  has a past medical history of Cancer (H), Cataract, Granuloma annulare, HTN (hypertension), Hyperlipidemia, IFG (impaired fasting glucose), Irritable bowel syndrome, Osteopenia, and Uveitis.  PAST SURGICAL HISTORY:  has a past surgical history that includes Hysterectomy; tonsillectomy; adenoidectomy; tubal ligation; nasal/sinus polypectomy; breast biopsy, rt/lt; CATARACT REMOVAL; REMOVAL OF OVARY(S); BLPHPLSTY UP EYELD; W/EXCESS SKIN; and Eye surgery.  IMMUNIZATIONS:  Immunization History   Administered Date(s) Administered     Influenza (High Dose) 3 valent vaccine 11/08/2016, 09/11/2017, 09/26/2018, 09/25/2019     Pneumo Conj 13-V (2010&after) 02/09/2015     Pneumococcal 23 valent 11/10/1998     TD (ADULT, 7+) 07/02/2003     TDAP Vaccine (Adacel) 04/21/2011     Zoster vaccine, live 03/27/2007     Above immunizations pulled  from [facility]. [facility] and facility records also reconciled. Outstanding information sent to  to update [facility].  Future immunizations are not needed at this point as all recommended immunizations are up to date.     Current Outpatient Medications   Medication Sig Dispense Refill     acetaminophen (TYLENOL) 650 MG CR tablet Take 1 tablet (650 mg) by mouth every 8 hours as needed for mild pain or fever 30 tablet 11     amLODIPine (NORVASC) 5 MG tablet TAKE 1 TABLET BY MOUTH ONCE DAILY 28 tablet 98     ASPIRIN ADULT LOW STRENGTH 81 MG EC tablet TAKE 1 TABLET BY MOUTH ONCE DAILY 28 tablet 98     atorvastatin (LIPITOR) 20 MG tablet TAKE 1 TABLET BY MOUTH ONCE DAILY 31 tablet 98     escitalopram (LEXAPRO) 10 MG tablet TAKE 1 TABLET BY MOUTH ONCE DAILY 31 tablet 98     loperamide (IMODIUM) 2 MG capsule Take 2 mg by mouth daily And 4mg for acute diarrhea. And 2mg after each loose stool. Max 8 capsules per day (16mg)       losartan (COZAAR) 50 MG tablet Take 2 tablets (100 mg) by mouth daily 28 tablet 98     methylphenidate (RITALIN) 5 MG tablet TAKE ONE-HALF TABLET (2.5MG) BY MOUTH ONCE DAILY 30 tablet 0     omeprazole (PRILOSEC) 10 MG DR capsule TAKE 1 CAPSULE BY MOUTH ONCE DAILY 31 capsule 98     Psyllium (SM FIBER) 51.7 % POWD Take 1 teaspoonful by mouth daily 283 Bottle 11       Case Management:  I have reviewed the Assisted Living care plan, current immunizations and preventive care/cancer screening. .Future cancer screening is not clinically indicated secondary to age/goals of care Patient's desire to return to the community is not assessible due to cognitive impairment. Current Level of Care is appropriate.    Advance Directive Discussion:    I reviewed the current advanced directives as reflected in EPIC, the POLST and the facility chart, and verified the congruency of orders. I contacted the first party son Tj and left a message regarding the plan of Care.  I did not due to cognitive  impairment review the advance directives with the resident.     Team Discussion:  I communicated with the appropriate disciplines involved with the Plan of Care:   Nursing    Patient's goal is pain control and comfort. Treat reversible conditions  Information reviewed:  Medications, vital signs, orders, and nursing notes.    ROS:  Limited secondary to cognitive impairment but today pt reports fine    Vitals:  BP (!) 140/83   Pulse 73   Temp 98.3  F (36.8  C)   Resp 16   Wt 84.8 kg (187 lb)   SpO2 95%   BMI 33.13 kg/m   Body mass index is 33.13 kg/m .  Exam:  GENERAL APPEARANCE: sleepy, in no acute distress, wakes eas  ENT:  Mouth and posterior oropharynx normal, dry mucous membranes  EYES:  EOM, conjunctivae, lids, pupils and irises normal  RESP:  respiratory effort and palpation of chest normal, lungs clear to auscultation   CV:  Palpation and auscultation of heart done , regular rate and rhythm, no murmur, rub, or gallop, trace- edema in ankles and feet  ABDOMEN:  normal bowel sounds, soft, nontender, distended   M/S:   Gait and Station below baseline with walker, ok ROM to LE bilateral, able to bear weight, no external rotation, shortened limb  SKIN:  Inspection of skin and subcutaneous tissue baseline to visualized areas  NEURO:   Cranial nerves 2-12 are normal tested and grossly at patient's baseline  PSYCH:  oriented to self    Lab/Diagnostic data:   CBC RESULTS:   Recent Labs   Lab Test 09/11/19 01/30/19   WBC 5.7 6.7   RBC 4.57 4.62   HGB 12.7 13.2   HCT 40.2 40.3   MCV 88 87   MCH 27.8 28.6   MCHC 31.6 32.8   RDW 14.5 14.3    198       Last Basic Metabolic Panel:  Recent Labs   Lab Test 09/11/19 01/30/19    141   POTASSIUM 4.1 4.1   CHLORIDE 105 109   PELON 9.8 8.9   CO2 25 25   BUN 14 25   CR 0.74 0.86   * 134*       Liver Function Studies -   Recent Labs   Lab Test 01/30/19 10/05/17  1443   PROTTOTAL 7.1 8.4   ALBUMIN 3.3* 4.1   BILITOTAL 0.5 1.0   ALKPHOS 64 100   AST 40 20   ALT  63* 45       TSH   Date Value Ref Range Status   01/30/2019 0.91 0.40 - 4.00 mU/L Final       Lab Results   Component Value Date    A1C 6.6 09/11/2019     ASSESSMENT/PLAN  (I10) Benign essential hypertension  (primary encounter diagnosis)  (Z86.73) History of CVA (cerebrovascular accident)  Comment: with elevated BP at times  Plan:   -Norvasc to 7.5 mg po daily  -Losartan 100 mg po daily   -Lipitor daily  -ASA 81 mg po daily    -BMP periodically     (E11.69) Type 2 diabetes mellitus with other specified complication, without long-term current use of insulin (H)  Comment: diet controlled but ongoing weight gain  Plan:   -follow A1C    (R63.5) Weight gain  Comment: ongoing since move to memory care  Plan:   -continue to monitor and offer healthy choices  -no longer of MVI    (K58.0) Irritable bowel syndrome with diarrhea  Comment: ask for bowel habits from staff  Plan:   -loperamide dose scheduled daily and prn (no prn used)  -psyllium daily    (G30.9,  F01.50,  F02.80) Mixed Alzheimer's and vascular dementia (H)  (G47.10) Hypersomnia  (F43.21) Adjustment disorder with depressed mood  Comment: ongoing  Plan:   -family reports some improvement with ritalin  -lexapro for mood/spirit                Electronically signed by:  ZAYNAB Hale CNP

## 2019-12-31 NOTE — LETTER
12/31/2019        RE: Salud Martinez On [address]  [address]  [address]  [place] [address]        [facility]  Chief Complaint   Patient presents with     Annual Comprehensive Nursing Home     [place] [facility] Record Number:  [id number]  Place of Service where encounter took place:  [facility] [[id number]]    HPI:    Salud Arroyo  is a 86 year old  (2/20/1933), who is being seen today for an annual comprehensive visit. HPI information obtained from: facility chart records and [facility] chart review.  Today's concerns are:    Continues with elevated BP at times. Losartan increased from 50 to 100 mg po daily 9/2019. Also takes Norvasc daily. Ongoing weight gain could be contributor.     Flat affect and spends most days/night in bed. Staff reporting they are getting her out more in community room for activities. Hypersomnia with advanced dementia.    Hx of falls with self transfers but none noted this month.     Hx of diarrhea and on daily dose of imodium. Unable to given reliable bowel history and unclear if bowel records are accurate.       ALLERGIES: Amoxicillin; Lisinopril; Ranitidine; and Zofran [ondansetron]  PAST MEDICAL HISTORY:  has a past medical history of Cancer (H), Cataract, Granuloma annulare, HTN (hypertension), Hyperlipidemia, IFG (impaired fasting glucose), Irritable bowel syndrome, Osteopenia, and Uveitis.  PAST SURGICAL HISTORY:  has a past surgical history that includes Hysterectomy; tonsillectomy; adenoidectomy; tubal ligation; nasal/sinus polypectomy; breast biopsy, rt/lt; CATARACT REMOVAL; REMOVAL OF OVARY(S); BLPHPLSTY UP EYELD; W/EXCESS SKIN; and Eye surgery.  IMMUNIZATIONS:  Immunization History   Administered Date(s) Administered     Influenza (High Dose) 3 valent vaccine 11/08/2016, 09/11/2017, 09/26/2018, 09/25/2019     Pneumo Conj 13-V (2010&after) 02/09/2015     Pneumococcal 23 valent 11/10/1998     TD (ADULT,  7+) 07/02/2003     TDAP Vaccine (Adacel) 04/21/2011     Zoster vaccine, live 03/27/2007     Above immunizations pulled from [facility]. [facility]IC and facility records also reconciled. Outstanding information sent to  to update [facility].  Future immunizations are not needed at this point as all recommended immunizations are up to date.     Current Outpatient Medications   Medication Sig Dispense Refill     acetaminophen (TYLENOL) 650 MG CR tablet Take 1 tablet (650 mg) by mouth every 8 hours as needed for mild pain or fever 30 tablet 11     amLODIPine (NORVASC) 5 MG tablet TAKE 1 TABLET BY MOUTH ONCE DAILY 28 tablet 98     ASPIRIN ADULT LOW STRENGTH 81 MG EC tablet TAKE 1 TABLET BY MOUTH ONCE DAILY 28 tablet 98     atorvastatin (LIPITOR) 20 MG tablet TAKE 1 TABLET BY MOUTH ONCE DAILY 31 tablet 98     escitalopram (LEXAPRO) 10 MG tablet TAKE 1 TABLET BY MOUTH ONCE DAILY 31 tablet 98     loperamide (IMODIUM) 2 MG capsule Take 2 mg by mouth daily And 4mg for acute diarrhea. And 2mg after each loose stool. Max 8 capsules per day (16mg)       losartan (COZAAR) 50 MG tablet Take 2 tablets (100 mg) by mouth daily 28 tablet 98     methylphenidate (RITALIN) 5 MG tablet TAKE ONE-HALF TABLET (2.5MG) BY MOUTH ONCE DAILY 30 tablet 0     omeprazole (PRILOSEC) 10 MG DR capsule TAKE 1 CAPSULE BY MOUTH ONCE DAILY 31 capsule 98     Psyllium (SM FIBER) 51.7 % POWD Take 1 teaspoonful by mouth daily 283 Bottle 11       Case Management:  I have reviewed the Assisted Living care plan, current immunizations and preventive care/cancer screening. .Future cancer screening is not clinically indicated secondary to age/goals of care Patient's desire to return to the community is not assessible due to cognitive impairment. Current Level of Care is appropriate.    Advance Directive Discussion:    I reviewed the current advanced directives as reflected in EPIC, the POLST and the facility chart, and verified the congruency of  orders. I contacted the first party son Tj and left a message regarding the plan of Care.  I did not due to cognitive impairment review the advance directives with the resident.     Team Discussion:  I communicated with the appropriate disciplines involved with the Plan of Care:   Nursing    Patient's goal is pain control and comfort. Treat reversible conditions  Information reviewed:  Medications, vital signs, orders, and nursing notes.    ROS:  Limited secondary to cognitive impairment but today pt reports fine    Vitals:  BP (!) 140/83   Pulse 73   Temp 98.3  F (36.8  C)   Resp 16   Wt 84.8 kg (187 lb)   SpO2 95%   BMI 33.13 kg/m    Body mass index is 33.13 kg/m .  Exam:  GENERAL APPEARANCE: sleepy, in no acute distress , wakes eas  ENT:  Mouth and posterior oropharynx normal, dry mucous membranes  EYES:  EOM, conjunctivae, lids, pupils and irises normal  RESP:  respiratory effort and palpation of chest normal, lungs clear to auscultation   CV:  Palpation and auscultation of heart done , regular rate and rhythm, no murmur, rub, or gallop, trace- edema in ankles and feet  ABDOMEN:  normal bowel sounds, soft, nontender, distended   M/S:   Gait and Station below baseline with walker, ok ROM to LE bilateral, able to bear weight, no external rotation, shortened limb  SKIN:  Inspection of skin and subcutaneous tissue baseline to visualized areas  NEURO:   Cranial nerves 2-12 are normal tested and grossly at patient's baseline  PSYCH:  oriented to self    Lab/Diagnostic data:   CBC RESULTS:   Recent Labs   Lab Test 09/11/19 01/30/19   WBC 5.7 6.7   RBC 4.57 4.62   HGB 12.7 13.2   HCT 40.2 40.3   MCV 88 87   MCH 27.8 28.6   MCHC 31.6 32.8   RDW 14.5 14.3    198       Last Basic Metabolic Panel:  Recent Labs   Lab Test 09/11/19 01/30/19    141   POTASSIUM 4.1 4.1   CHLORIDE 105 109   PELON 9.8 8.9   CO2 25 25   BUN 14 25   CR 0.74 0.86   * 134*       Liver Function Studies -   Recent Labs   Lab  Test 01/30/19 10/05/17  1443   PROTTOTAL 7.1 8.4   ALBUMIN 3.3* 4.1   BILITOTAL 0.5 1.0   ALKPHOS 64 100   AST 40 20   ALT 63* 45       TSH   Date Value Ref Range Status   01/30/2019 0.91 0.40 - 4.00 mU/L Final       Lab Results   Component Value Date    A1C 6.6 09/11/2019     ASSESSMENT/PLAN  (I10) Benign essential hypertension  (primary encounter diagnosis)  (Z86.73) History of CVA (cerebrovascular accident)  Comment: with elevated BP at times  Plan:   -Norvasc to 7.5 mg po daily  -Losartan 100 mg po daily   -Lipitor daily  -ASA 81 mg po daily    -BMP periodically     (E11.69) Type 2 diabetes mellitus with other specified complication, without long-term current use of insulin (H)  Comment: diet controlled but ongoing weight gain  Plan:   -follow A1C    (R63.5) Weight gain  Comment: ongoing since move to memory care  Plan:   -continue to monitor and offer healthy choices  -no longer of MVI    (K58.0) Irritable bowel syndrome with diarrhea  Comment: ask for bowel habits from staff  Plan:   -loperamide dose scheduled daily and prn (no prn used)  -psyllium daily    (G30.9,  F01.50,  F02.80) Mixed Alzheimer's and vascular dementia (H)  (G47.10) Hypersomnia  (F43.21) Adjustment disorder with depressed mood  Comment: ongoing  Plan:   -family reports some improvement with ritalin  -lexapro for mood/spirit                Electronically signed by:  ZAYNAB Hale CNP               Sincerely,        ZAYNAB Hale CNP

## 2020-01-01 ENCOUNTER — TELEPHONE (OUTPATIENT)
Dept: GERIATRICS | Facility: CLINIC | Age: 85
End: 2020-01-01

## 2020-01-01 ENCOUNTER — ASSISTED LIVING VISIT (OUTPATIENT)
Dept: GERIATRICS | Facility: CLINIC | Age: 85
End: 2020-01-01
Payer: MEDICARE

## 2020-01-01 VITALS
OXYGEN SATURATION: 95 % | WEIGHT: 187 LBS | SYSTOLIC BLOOD PRESSURE: 140 MMHG | DIASTOLIC BLOOD PRESSURE: 83 MMHG | RESPIRATION RATE: 16 BRPM | HEART RATE: 73 BPM | BODY MASS INDEX: 33.13 KG/M2 | TEMPERATURE: 98.3 F

## 2020-01-01 VITALS
HEIGHT: 63 IN | TEMPERATURE: 97.6 F | SYSTOLIC BLOOD PRESSURE: 143 MMHG | WEIGHT: 183.6 LBS | DIASTOLIC BLOOD PRESSURE: 77 MMHG | BODY MASS INDEX: 32.53 KG/M2 | RESPIRATION RATE: 15 BRPM | HEART RATE: 73 BPM

## 2020-01-01 DIAGNOSIS — R06.02 SOB (SHORTNESS OF BREATH): Primary | ICD-10-CM

## 2020-01-01 DIAGNOSIS — F02.80 MIXED ALZHEIMER'S AND VASCULAR DEMENTIA (H): ICD-10-CM

## 2020-01-01 DIAGNOSIS — R63.5 WEIGHT GAIN: ICD-10-CM

## 2020-01-01 DIAGNOSIS — F43.21 ADJUSTMENT DISORDER WITH DEPRESSED MOOD: ICD-10-CM

## 2020-01-01 DIAGNOSIS — G47.10 HYPERSOMNIA: ICD-10-CM

## 2020-01-01 DIAGNOSIS — K58.0 IRRITABLE BOWEL SYNDROME WITH DIARRHEA: ICD-10-CM

## 2020-01-01 DIAGNOSIS — Z51.5: ICD-10-CM

## 2020-01-01 DIAGNOSIS — R19.7 DIARRHEA, UNSPECIFIED TYPE: ICD-10-CM

## 2020-01-01 DIAGNOSIS — R52 GENERALIZED PAIN: ICD-10-CM

## 2020-01-01 DIAGNOSIS — F01.50 MIXED ALZHEIMER'S AND VASCULAR DEMENTIA (H): ICD-10-CM

## 2020-01-01 DIAGNOSIS — Z51.5 DYING CARE: Primary | ICD-10-CM

## 2020-01-01 DIAGNOSIS — K59.1 FUNCTIONAL DIARRHEA: Primary | ICD-10-CM

## 2020-01-01 DIAGNOSIS — G30.9 MIXED ALZHEIMER'S AND VASCULAR DEMENTIA (H): ICD-10-CM

## 2020-01-01 DIAGNOSIS — K59.1 FUNCTIONAL DIARRHEA: ICD-10-CM

## 2020-01-01 DIAGNOSIS — E11.69 TYPE 2 DIABETES MELLITUS WITH OTHER SPECIFIED COMPLICATION, WITHOUT LONG-TERM CURRENT USE OF INSULIN (H): ICD-10-CM

## 2020-01-01 DIAGNOSIS — I10 BENIGN ESSENTIAL HYPERTENSION: ICD-10-CM

## 2020-01-01 DIAGNOSIS — I10 BENIGN ESSENTIAL HYPERTENSION: Primary | ICD-10-CM

## 2020-01-01 DIAGNOSIS — R06.02 SOB (SHORTNESS OF BREATH): ICD-10-CM

## 2020-01-01 DIAGNOSIS — K21.9 GASTROESOPHAGEAL REFLUX DISEASE WITHOUT ESOPHAGITIS: ICD-10-CM

## 2020-01-01 DIAGNOSIS — I25.10 CORONARY ARTERY DISEASE INVOLVING NATIVE HEART WITHOUT ANGINA PECTORIS, UNSPECIFIED VESSEL OR LESION TYPE: ICD-10-CM

## 2020-01-01 RX ORDER — METHYLPHENIDATE HYDROCHLORIDE 5 MG/1
TABLET ORAL
Qty: 30 TABLET | Refills: 0 | Status: SHIPPED | OUTPATIENT
Start: 2020-01-01 | End: 2020-01-01

## 2020-01-01 RX ORDER — METHYLPHENIDATE HYDROCHLORIDE 5 MG/1
TABLET ORAL
Qty: 30 TABLET | Refills: 0 | Status: SHIPPED | OUTPATIENT
Start: 2020-01-01

## 2020-01-01 RX ORDER — ATORVASTATIN CALCIUM 20 MG/1
TABLET, FILM COATED ORAL
Qty: 28 TABLET | Status: SHIPPED | OUTPATIENT
Start: 2020-01-01

## 2020-01-01 RX ORDER — ASPIRIN 81 MG/1
TABLET, COATED ORAL
Qty: 28 TABLET | Status: SHIPPED | OUTPATIENT
Start: 2020-01-01

## 2020-01-01 RX ORDER — AMOXICILLIN 250 MG
1 CAPSULE ORAL 2 TIMES DAILY PRN
COMMUNITY

## 2020-01-01 RX ORDER — AMLODIPINE BESYLATE 5 MG/1
TABLET ORAL
Qty: 28 TABLET | Refills: 98 | Status: SHIPPED | OUTPATIENT
Start: 2020-01-01

## 2020-01-01 RX ORDER — LOPERAMIDE HCL 2 MG
CAPSULE ORAL
Qty: 28 CAPSULE | Refills: 99 | COMMUNITY
Start: 2020-01-01 | End: 2020-01-01

## 2020-01-01 RX ORDER — BISACODYL 10 MG
10 SUPPOSITORY, RECTAL RECTAL DAILY PRN
COMMUNITY

## 2020-01-01 RX ORDER — OMEPRAZOLE 10 MG/1
CAPSULE, DELAYED RELEASE ORAL
Qty: 31 CAPSULE | Status: SHIPPED | OUTPATIENT
Start: 2020-01-01

## 2020-01-01 RX ORDER — MORPHINE SULFATE 30 MG/1
2.5 TABLET ORAL
Qty: 15 TABLET | Refills: 0 | Status: SHIPPED | OUTPATIENT
Start: 2020-01-01

## 2020-01-01 RX ORDER — LOPERAMIDE HCL 2 MG
CAPSULE ORAL
Qty: 28 CAPSULE | Refills: 99 | Status: SHIPPED | OUTPATIENT
Start: 2020-01-01 | End: 2020-01-01

## 2020-01-01 RX ORDER — LOPERAMIDE HCL 2 MG
CAPSULE ORAL
Qty: 12 CAPSULE | Status: SHIPPED | OUTPATIENT
Start: 2020-01-01

## 2020-01-01 RX ORDER — MORPHINE SULFATE 100 MG/5ML
5 SOLUTION ORAL
Qty: 30 ML | Refills: 0 | Status: SHIPPED | OUTPATIENT
Start: 2020-01-01 | End: 2020-01-01

## 2020-01-01 RX ORDER — ESCITALOPRAM OXALATE 10 MG/1
TABLET ORAL
Qty: 28 TABLET | Refills: 99 | Status: SHIPPED | OUTPATIENT
Start: 2020-01-01

## 2020-01-01 ASSESSMENT — MIFFLIN-ST. JEOR: SCORE: 1236.93

## 2020-02-25 PROBLEM — E11.69 TYPE 2 DIABETES MELLITUS WITH OTHER SPECIFIED COMPLICATION, WITHOUT LONG-TERM CURRENT USE OF INSULIN (H): Status: ACTIVE | Noted: 2019-01-01

## 2020-02-25 NOTE — PROGRESS NOTES
[facility]  [place] Medical Record Number:  [id number]  Place of Service where encounter took place:  [facility] [[id number]]  Chief Complaint   Patient presents with     RECHECK       HPI:    Salud Arroyo  is a 87 year old (2/20/1933), who is being seen today for an episodic care visit.  HPI information obtained from: facility chart records, facility staff, patient report and [facility] chart review. Today's concern is:    Seen today for follow up to HTN, hypersomnia, dementia and IBS.BP with improved control since Losartan increased from 50 to 100 mg daily 9/2019 and Norvasc to 7.5 mg po daily 12/2019. Continues to spend much time in bed sleeping and probably contributor to weight gain. Was 150-160s at admission 9/2017 now 180s. Morning medications now given at 11am. Family reports daily ritalin is helpful (did reduce dose). Flat affect is baseline. Staff reporting they are getting her out more in community room for activities. Hypersomnia with advanced dementia. Hx of diarrhea and on daily dose of imodium but then with constipation. Bowel medications adjusted with reduction in Imodium to three times weekly.    Past Medical and Surgical History reviewed in Epic today.    MEDICATIONS:    Current Outpatient Medications   Medication Sig Dispense Refill     acetaminophen (TYLENOL) 650 MG CR tablet Take 1 tablet (650 mg) by mouth every 8 hours as needed for mild pain or fever 30 tablet 11     amLODIPine (NORVASC) 5 MG tablet Take 1 tablet (5 mg) by mouth daily AND 0.5 tablets (2.5 mg) daily. 28 tablet 98     ASPIRIN ADULT LOW STRENGTH 81 MG EC tablet TAKE 1 TABLET BY MOUTH ONCE DAILY 28 tablet 98     atorvastatin (LIPITOR) 20 MG tablet TAKE 1 TABLET BY MOUTH ONCE DAILY 31 tablet 98     bisacodyl (DULCOLAX) 10 MG suppository Place 10 mg rectally daily as needed for constipation       escitalopram (LEXAPRO) 10 MG tablet TAKE 1 TABLET BY MOUTH ONCE DAILY 28 tablet  "99     loperamide (IMODIUM) 2 MG capsule TAKE ONE CAPSULE BY MOUTH DAILY & TAKE TWO CAPSULES (4MG) FOR ACUTE DIARRHEA;& TAKE ONE CAPSULE BY MOUTH AFTER EACH LOOSE STOOL MAX 8 CAPSULES PER DAY (16MG) 28 capsule 99     losartan (COZAAR) 50 MG tablet Take 2 tablets (100 mg) by mouth daily 28 tablet 98     methylphenidate (RITALIN) 5 MG tablet TAKE ONE-HALF TABLET (2.5MG) BY MOUTH ONCE DAILY 30 tablet 0     omeprazole (PRILOSEC) 10 MG DR capsule TAKE 1 CAPSULE BY MOUTH ONCE DAILY 31 capsule PRN     Psyllium (SM FIBER) 51.7 % POWD Take 1 teaspoonful by mouth daily 283 Bottle 11     senna-docusate (SENOKOT-S/PERICOLACE) 8.6-50 MG tablet Take 1 tablet by mouth 2 times daily as needed for constipation       REVIEW OF SYSTEMS:  Limited secondary to cognitive impairment but today pt reports fine    Objective:  BP (!) 143/77   Pulse 73   Temp 97.6  F (36.4  C)   Resp 15   Ht 1.6 m (5' 3\")   Wt 83.3 kg (183 lb 9.6 oz)   BMI 32.52 kg/m    Exam:  GENERAL APPEARANCE: pleasant, calm but flat  ENT:  Mouth and posterior oropharynx normal, dry mucous membranes  EYES:  EOM, conjunctivae, lids, pupils and irises normal  RESP:  respiratory effort and palpation of chest normal, lungs clear to auscultation   CV:  Palpation and auscultation of heart done , regular rate and rhythm, no murmur, rub, or gallop, trace- edema in ankles and feet  ABDOMEN: hypoactive bowel sounds, soft, nontender, distended   M/S:   Gait and Station baseline with walker  SKIN:  Inspection of skin and subcutaneous tissue intact to visualized areas  NEURO:   Cranial nerves 2-12 are normal tested and grossly at patient's baseline  PSYCH:  oriented to self, recent history at times    Labs:   CBC RESULTS:   Recent Labs   Lab Test 09/11/19 01/30/19   WBC 5.7 6.7   RBC 4.57 4.62   HGB 12.7 13.2   HCT 40.2 40.3   MCV 88 87   MCH 27.8 28.6   MCHC 31.6 32.8   RDW 14.5 14.3    198       Last Basic Metabolic Panel:  Recent Labs   Lab Test 09/11/19 01/30/19   NA " 139 141   POTASSIUM 4.1 4.1   CHLORIDE 105 109   PELON 9.8 8.9   CO2 25 25   BUN 14 25   CR 0.74 0.86   * 134*       Liver Function Studies -   Recent Labs   Lab Test 01/30/19 10/05/17  1443   PROTTOTAL 7.1 8.4   ALBUMIN 3.3* 4.1   BILITOTAL 0.5 1.0   ALKPHOS 64 100   AST 40 20   ALT 63* 45       TSH   Date Value Ref Range Status   01/30/2019 0.91 0.40 - 4.00 mU/L Final       Lab Results   Component Value Date    A1C 6.6 09/11/2019     ASSESSMENT/PLAN:  (I10) Benign essential hypertension  (primary encounter diagnosis)  Comment: ok for geriatrics   Plan:   -Norvasc 7.5 mg po daily  -Losartan 100 mg po daily   -Lipitor daily  -ASA 81 mg po daily    -BMP periodically     (G47.10) Hypersomnia  Comment: sleeps in between meals, attends some activities   Plan:   -continue current plan of care    (R63.5) Weight gain  Comment: ongoing since admission to memory care  Plan:   -continue to monitor and offer healthy choices  -follow weights     (G30.9,  F01.50,  F02.80) Mixed Alzheimer's and vascular dementia (H)  Comment: advanced  Plan:   -lexapro for mood and spirit     (K58.0) Irritable bowel syndrome with diarrhea  (K59.1) Functional diarrhea  Comment: staff reports she refuses psyllium  Plan:   -psyillum-marked as given but RA states mostly refused  -dulcolax daily prn  -Senna S twice daily prn  -Imodium 2 mg po 3 times weekly and 4 mg po prn for acute diarrhea    (E11.69) Type 2 diabetes mellitus with other specified complication, without long-term current use of insulin (H)  Comment: no longer checking blood sugars  Plan:   -follow A1C        Electronically signed by:  ZAYNAB Hale CNP

## 2020-02-26 NOTE — LETTER
2/26/2020        RE: Salud [facility] [address]  [address]  [address]  [place] [address]        [facility]  [place] Medical Record Number:  [id number]  Place of Service where encounter took place:  [facility] [[id number]]  Chief Complaint   Patient presents with     RECHECK       HPI:    Salud Arroyo  is a 87 year old (2/20/1933), who is being seen today for an episodic care visit.  HPI information obtained from: facility chart records, facility staff, patient report and [facility] chart review. Today's concern is:    Seen today for follow up to HTN, hypersomnia, dementia and IBS.BP with improved control since Losartan increased from 50 to 100 mg daily 9/2019 and Norvasc to 7.5 mg po daily 12/2019. Continues to spend much time in bed sleeping and probably contributor to weight gain. Was 150-160s at admission 9/2017 now 180s. Morning medications now given at 11am. Family reports daily ritalin is helpful (did reduce dose). Flat affect is baseline. Staff reporting they are getting her out more in community room for activities. Hypersomnia with advanced dementia. Hx of diarrhea and on daily dose of imodium but then with constipation. Bowel medications adjusted with reduction in Imodium to three times weekly.    Past Medical and Surgical History reviewed in Epic today.    MEDICATIONS:    Current Outpatient Medications   Medication Sig Dispense Refill     acetaminophen (TYLENOL) 650 MG CR tablet Take 1 tablet (650 mg) by mouth every 8 hours as needed for mild pain or fever 30 tablet 11     amLODIPine (NORVASC) 5 MG tablet Take 1 tablet (5 mg) by mouth daily AND 0.5 tablets (2.5 mg) daily. 28 tablet 98     ASPIRIN ADULT LOW STRENGTH 81 MG EC tablet TAKE 1 TABLET BY MOUTH ONCE DAILY 28 tablet 98     atorvastatin (LIPITOR) 20 MG tablet TAKE 1 TABLET BY MOUTH ONCE DAILY 31 tablet 98     bisacodyl (DULCOLAX) 10 MG suppository Place 10 mg rectally  "daily as needed for constipation       escitalopram (LEXAPRO) 10 MG tablet TAKE 1 TABLET BY MOUTH ONCE DAILY 28 tablet 99     loperamide (IMODIUM) 2 MG capsule TAKE ONE CAPSULE BY MOUTH DAILY & TAKE TWO CAPSULES (4MG) FOR ACUTE DIARRHEA;& TAKE ONE CAPSULE BY MOUTH AFTER EACH LOOSE STOOL MAX 8 CAPSULES PER DAY (16MG) 28 capsule 99     losartan (COZAAR) 50 MG tablet Take 2 tablets (100 mg) by mouth daily 28 tablet 98     methylphenidate (RITALIN) 5 MG tablet TAKE ONE-HALF TABLET (2.5MG) BY MOUTH ONCE DAILY 30 tablet 0     omeprazole (PRILOSEC) 10 MG DR capsule TAKE 1 CAPSULE BY MOUTH ONCE DAILY 31 capsule PRN     Psyllium (SM FIBER) 51.7 % POWD Take 1 teaspoonful by mouth daily 283 Bottle 11     senna-docusate (SENOKOT-S/PERICOLACE) 8.6-50 MG tablet Take 1 tablet by mouth 2 times daily as needed for constipation       REVIEW OF SYSTEMS:  Limited secondary to cognitive impairment but today pt reports fine    Objective:  BP (!) 143/77   Pulse 73   Temp 97.6  F (36.4  C)   Resp 15   Ht 1.6 m (5' 3\")   Wt 83.3 kg (183 lb 9.6 oz)   BMI 32.52 kg/m     Exam:  GENERAL APPEARANCE:  pleasant, calm but flat  ENT:  Mouth and posterior oropharynx normal, dry mucous membranes  EYES:  EOM, conjunctivae, lids, pupils and irises normal  RESP:  respiratory effort and palpation of chest normal, lungs clear to auscultation   CV:  Palpation and auscultation of heart done , regular rate and rhythm, no murmur, rub, or gallop, trace- edema in ankles and feet  ABDOMEN: hypoactive bowel sounds, soft, nontender, distended   M/S:   Gait and Station baseline with walker  SKIN:  Inspection of skin and subcutaneous tissue intact to visualized areas  NEURO:   Cranial nerves 2-12 are normal tested and grossly at patient's baseline  PSYCH:  oriented to self, recent history at times    Labs:   CBC RESULTS:   Recent Labs   Lab Test 09/11/19 01/30/19   WBC 5.7 6.7   RBC 4.57 4.62   HGB 12.7 13.2   HCT 40.2 40.3   MCV 88 87   MCH 27.8 28.6   MCHC " 31.6 32.8   RDW 14.5 14.3    198       Last Basic Metabolic Panel:  Recent Labs   Lab Test 09/11/19 01/30/19    141   POTASSIUM 4.1 4.1   CHLORIDE 105 109   PELON 9.8 8.9   CO2 25 25   BUN 14 25   CR 0.74 0.86   * 134*       Liver Function Studies -   Recent Labs   Lab Test 01/30/19 10/05/17  1443   PROTTOTAL 7.1 8.4   ALBUMIN 3.3* 4.1   BILITOTAL 0.5 1.0   ALKPHOS 64 100   AST 40 20   ALT 63* 45       TSH   Date Value Ref Range Status   01/30/2019 0.91 0.40 - 4.00 mU/L Final       Lab Results   Component Value Date    A1C 6.6 09/11/2019     ASSESSMENT/PLAN:  (I10) Benign essential hypertension  (primary encounter diagnosis)  Comment: ok for geriatrics   Plan:   -Norvasc 7.5 mg po daily  -Losartan 100 mg po daily   -Lipitor daily  -ASA 81 mg po daily    -BMP periodically     (G47.10) Hypersomnia  Comment: sleeps in between meals, attends some activities   Plan:   -continue current plan of care    (R63.5) Weight gain  Comment: ongoing since admission to memory care  Plan:   -continue to monitor and offer healthy choices  -follow weights     (G30.9,  F01.50,  F02.80) Mixed Alzheimer's and vascular dementia (H)  Comment: advanced  Plan:   -lexapro for mood and spirit     (K58.0) Irritable bowel syndrome with diarrhea  (K59.1) Functional diarrhea  Comment: staff reports she refuses psyllium  Plan:   -psyillum-marked as given but RA states mostly refused  -dulcolax daily prn  -Senna S twice daily prn  -Imodium 2 mg po 3 times weekly and 4 mg po prn for acute diarrhea    (E11.69) Type 2 diabetes mellitus with other specified complication, without long-term current use of insulin (H)  Comment: no longer checking blood sugars  Plan:   -follow A1C        Electronically signed by:  ZAYNAB Hale CNP               Sincerely,        ZAYNAB Hale CNP

## 2020-05-17 NOTE — TELEPHONE ENCOUNTER
Facility does not take liquid morphine and so will order morphine solu-tab 2.5mg every 2 hours prn instead.    Electronically signed by Ellen Hurtado RN, CNP

## 2020-05-17 NOTE — TELEPHONE ENCOUNTER
Nursing calling that Salud is showing change in her vitals and wondering if able to refer to hospice.  COVID 19 negative on 5/5/2020  Expected to die.    T = 98.2, 131/86, P = 117, 78% and R = 48      Ok to refer to hospice of choice.    Roxanol 20mg/ml 5mg or 0.25ml every 1 hour prn for SOB, pain, restlessness    Electronically signed by Ellen Hurtado RN, CNP

## 2020-05-18 RX ORDER — PSYLLIUM HUSK 3 G/5.8 G
POWDER (GRAM) ORAL
Qty: 283 G | OUTPATIENT
Start: 2020-05-18

## 2025-01-29 NOTE — PROGRESS NOTES
[facility]  Chief Complaint   Patient presents with     Annual Comprehensive Exam Assisted Living       [facility] Medical Record Number:  [id number]  Place of Service where encounter took place:  [facility] [[id number]]      HPI:    Salud Arroyo is a 85 year old  (2/20/1933), who is being seen today for an annual comprehensive visit.  HPI information obtained from: facility chart records, [facility] chart review and family/first contact son-Tj report.  Today's concerns are:    History of CVA (cerebrovascular accident)  Hypertension  Denies chest pain. Continues on ASA, statin. Trace edema in legs and feet. Weight up from admission around 10 lbs. Las ordered 8/5/18 but not done.    BP Readings from Last 3 Encounters:   01/23/19 132/62   10/24/18 130/69   08/15/18 120/65     Adjustment disorder with depressed mood  Hypersomnia  Pleasant and calm. Reports to being in good spirits. Flat affect. Staff reports she is attending some activities on the unit, more engaging at times. GDS score of 4 on 1/7/19. Continues on Escitalopram 10 mg po daily. Does spend time in room between meals/activities. On reduced dose of Methylphenidate starting 8/2018 no change with behaviors. Family states her sister passed and they have not updated yet.     Irritable bowel syndrome with diarrhea  Had many loose stools and diarrhea incontinent episodes while living in [place]. Moved to memory care 3/2018. No recent concerns from staff. Continues on daily dose of loperamide. Staff reporting she is not aware of needing to void or have bowel movement. Family with concerns for how much incontinent products are used daily and that episodes continue.    HÉCTOR (obstructive sleep apnea)  Has not tolerated CPAP and is not using          BP goals are <150/90 mm Hg.This is higher than ACC and AHA recommendations due to goals of care and risk for hypotension. Patient is stable with current plan of care  and routine assessment.    ALLERGIES: Amoxicillin; Lisinopril; Ranitidine; and Zofran [ondansetron]  PROBLEM LIST:  Patient Active Problem List   Diagnosis     History of CVA (cerebrovascular accident)     Nausea and vomiting     Generalized muscle weakness     Falls frequently     Hyperglycemia     History of basal cell carcinoma     Osteopenia of multiple sites     Anxiety     Actinic keratosis     Intestinal disaccharidase deficiencies and disaccharide malabsorption     Osteoarthritis of multiple joints     Prolapse of vaginal wall     Erythematous condition     Hyperlipidemia     Irritable bowel syndrome     Essential hypertension     IFG (impaired fasting glucose)     Granuloma annulare     Hypersomnia     Hypokalemia     Adjustment disorder with depressed mood     HÉCTOR (obstructive sleep apnea)     Cognitive decline     PAST MEDICAL HISTORY:  has a past medical history of Cancer (H), Cataract, Granuloma annulare, HTN (hypertension), Hyperlipidemia, IFG (impaired fasting glucose), Irritable bowel syndrome, Osteopenia, and Uveitis.  PAST SURGICAL HISTORY:  has a past surgical history that includes Hysterectomy; tonsillectomy; adenoidectomy; tubal ligation; nasal/sinus polypectomy; breast biopsy, rt/lt; CATARACT REMOVAL; REMOVAL OF OVARY(S); BLPHPLSTY UP EYELD; W/EXCESS SKIN; and Eye surgery.  FAMILY HISTORY: family history includes Breast Cancer in her sister and another family member; Cancer in her sister; Heart Disease in her mother; Macular Degeneration in her maternal aunt.  SOCIAL HISTORY:    IMMUNIZATIONS:  Most Recent Immunizations   Administered Date(s) Administered     Influenza (High Dose) 3 valent vaccine 09/26/2018     Pneumo Conj 13-V (2010&after) 02/09/2015     Pneumococcal 23 valent 11/10/1998     TD (ADULT, 7+) 07/02/2003     TDAP Vaccine (Adacel) 04/21/2011     Zoster vaccine, live 03/27/2007     Above immunizations pulled from [facility]. [facility] and facility records also reconciled. Outstanding  information sent to  to update [facility].  Future immunizations are not needed at this point as all recommended immunizations are up to date.   MEDICATIONS:  Current Outpatient Medications   Medication Sig Dispense Refill     amLODIPine (NORVASC) 5 MG tablet Take 1 tablet (5 mg) by mouth daily 31 tablet 98     aspirin 81 MG EC tablet Take 1 tablet (81 mg) by mouth daily 30 tablet 98     atorvastatin (LIPITOR) 40 MG tablet Take 1 tablet (40 mg) by mouth daily 30 tablet 98     ESCITALOPRAM OXALATE PO Take 10 mg by mouth daily       loperamide (IMODIUM) 2 MG capsule Take 2 mg by mouth daily And 4mg for acute diarrhea. And 2mg after each loose stool. Max 8 capsules per day (16mg)       losartan (COZAAR) 50 MG tablet Take 1 tablet (50 mg) by mouth daily 31 tablet 98     Multiple Vitamins-Iron (MULTI-VITAMIN  /IRON) TABS Take 1 tablet by mouth daily 31 tablet 98     omeprazole (PRILOSEC) 10 MG DR capsule TAKE 1 CAPSULE BY MOUTH ONCE DAILY 31 capsule 98     Patient Active Problem List   Diagnosis     History of CVA (cerebrovascular accident)     Nausea and vomiting     Generalized muscle weakness     Falls frequently     Hyperglycemia     History of basal cell carcinoma     Osteopenia of multiple sites     Anxiety     Actinic keratosis     Intestinal disaccharidase deficiencies and disaccharide malabsorption     Osteoarthritis of multiple joints     Prolapse of vaginal wall     Erythematous condition     Hyperlipidemia     Irritable bowel syndrome     Essential hypertension     IFG (impaired fasting glucose)     Granuloma annulare     Hypersomnia     Hypokalemia     Adjustment disorder with depressed mood     HÉCTOR (obstructive sleep apnea)     Cognitive decline       Medications reviewed:  Medications reconciled to facility chart and changes were made to reflect current medications as identified as above med list. Below are the changes that were made:   Medications stopped since last EPIC medication  reconciliation:   Medications Discontinued During This Encounter   Medication Reason     methylphenidate (RITALIN) 5 MG tablet Therapy completed       Medications started since last [facility] medication reconciliation:  No orders of the defined types were placed in this encounter.      Case Management:  I have reviewed the Assisted Living care plan, current immunizations and preventive care/cancer screening..Future cancer screening is not clinically indicated secondary to age/goals of care Patient's desire to return to the community is not assessible due to cognitive impairment. Current Level of Care is appropriate.    Advance Directive Discussion:    I reviewed the current advanced directives as reflected in EPIC, the POLST and the facility chart, and verified the congruency of orders 1/23/19. I contacted the first party Tj and left a message regarding the plan of Care.  I did not due to cognitive impairment review the advance directives with the resident.     Team Discussion:  I communicated with the appropriate disciplines involved with the Plan of Care:   Nursing      Patient Goal:  Patient's goal is pain control and comfort. Treat reversible conditions    Information reviewed:  Medications, vital signs, orders, and nursing notes.    ROS:  Limited secondary to cognitive impairment but today pt reports fine    Exam:  /62   Pulse 71   Temp 99.5  F (37.5  C)   Resp 16   Wt 74.8 kg (165 lb)   SpO2 97%   BMI 29.23 kg/m    GENERAL APPEARANCE:  Alert, in no distress, cooperative and smiling, sleeping in bed  ENT:  Mouth and posterior oropharynx normal, dry mucous membranes  EYES:  EOM, conjunctivae, lids, pupils and irises normal  NECK:  No adenopathy,masses or thyromegaly  RESP:  respiratory effort and palpation of chest normal, lungs clear to auscultation   CV:  Palpation and auscultation of heart done , regular rate and rhythm, no murmur, rub, or gallop, no edema  ABDOMEN:  normal bowel sounds, soft,  nontender  BREASTS: no palpated lumps  M/S:   Gait and Station at baseline with walker  SKIN:  Inspection of skin and subcutaneous tissue baseline  NEURO:   Cranial nerves 2-12 are normal tested and grossly at patient's baseline  PSYCH:  oriented X 2 but forgetful.     Lab/Diagnostic data:   CBC RESULTS:   Recent Labs   Lab Test 10/05/17  1443   WBC 6.8   RBC 5.23*   HGB 15.3   HCT 43.5   MCV 83   MCH 29.3   MCHC 35.2   RDW 14.0          Last Basic Metabolic Panel:  Recent Labs   Lab Test 02/04/18 10/05/17  1443    136   POTASSIUM 4.4 3.8   CHLORIDE 106 100   PELON 9.3 10.2*   CO2 27 23   BUN 13 10   CR 0.82 0.73   * 147*       Liver Function Studies -   Recent Labs   Lab Test 10/05/17  1443   PROTTOTAL 8.4   ALBUMIN 4.1   BILITOTAL 1.0   ALKPHOS 100   AST 20   ALT 45     ASSESSMENT/PLAN  (Z86.73) History of CVA (cerebrovascular accident)  (primary encounter diagnosis)  (I10) Essential hypertension  Comment: Stable  Plan:   -Amlodipine 5 mg po daily  -ASA 81 mg po daily for secondary prevention  -Atorvastatin 40 mg po daily  -Losartan 50 mg po daily  -VS and weights monthly  -CBC, CMP, Lipids 1/30/19    (F43.21) Adjustment disorder with depressed mood  (G47.10) Hypersomnia  Comment: Ongoing  Plan:  -Escitalopram 10 mg po daily  -Discontinuing methylphenidate   -Daily encouragement from staff to participate in activities  -Unclear if ACP would be helpful    (K58.0) Irritable bowel syndrome with diarrhea  Comment: Chronic and stable  Plan:   -omeprazole 10 mg po daily  -loperamide 2 mg po daily and prn for acute diarrhea    (G47.33) HÉCTOR (obstructive sleep apnea)  Comment: Ongoing  Plan:   -Does not use or tolerate CPAP      Electronically signed by:  ZAYNAB Hale CNP       96